# Patient Record
Sex: MALE | Race: WHITE | Employment: OTHER | URBAN - METROPOLITAN AREA
[De-identification: names, ages, dates, MRNs, and addresses within clinical notes are randomized per-mention and may not be internally consistent; named-entity substitution may affect disease eponyms.]

---

## 2017-03-06 PROBLEM — N99.114 POSTPROCEDURAL MALE URETHRAL STRICTURE: Status: ACTIVE | Noted: 2017-03-06

## 2017-03-06 PROBLEM — N52.9 ERECTILE DYSFUNCTION: Status: RESOLVED | Noted: 2017-03-06 | Resolved: 2017-03-06

## 2017-03-06 PROBLEM — N52.9 ERECTILE DYSFUNCTION: Status: ACTIVE | Noted: 2017-03-06

## 2023-10-16 ENCOUNTER — APPOINTMENT (OUTPATIENT)
Dept: CT IMAGING | Age: 68
DRG: 438 | End: 2023-10-16
Payer: MEDICARE

## 2023-10-16 ENCOUNTER — HOSPITAL ENCOUNTER (INPATIENT)
Age: 68
LOS: 2 days | Discharge: HOME | DRG: 438 | End: 2023-10-18
Attending: EMERGENCY MEDICINE | Admitting: INTERNAL MEDICINE
Payer: MEDICARE

## 2023-10-16 ENCOUNTER — APPOINTMENT (OUTPATIENT)
Dept: ULTRASOUND IMAGING | Age: 68
DRG: 438 | End: 2023-10-16
Payer: MEDICARE

## 2023-10-16 ENCOUNTER — APPOINTMENT (OUTPATIENT)
Dept: MRI IMAGING | Age: 68
DRG: 438 | End: 2023-10-16
Payer: MEDICARE

## 2023-10-16 ENCOUNTER — APPOINTMENT (OUTPATIENT)
Dept: GENERAL RADIOLOGY | Age: 68
DRG: 438 | End: 2023-10-16
Payer: MEDICARE

## 2023-10-16 DIAGNOSIS — K85.10 ACUTE BILIARY PANCREATITIS, UNSPECIFIED COMPLICATION STATUS: ICD-10-CM

## 2023-10-16 DIAGNOSIS — K80.50 CALCULUS OF BILE DUCT WITHOUT CHOLECYSTITIS AND WITHOUT OBSTRUCTION: Primary | ICD-10-CM

## 2023-10-16 DIAGNOSIS — N39.0 URINARY TRACT INFECTION WITHOUT HEMATURIA, SITE UNSPECIFIED: ICD-10-CM

## 2023-10-16 PROBLEM — E78.5 HYPERLIPIDEMIA: Status: ACTIVE | Noted: 2023-10-16

## 2023-10-16 PROBLEM — R10.11 RUQ ABDOMINAL PAIN: Status: ACTIVE | Noted: 2023-10-16

## 2023-10-16 PROBLEM — E11.9 DIABETES (HCC): Status: ACTIVE | Noted: 2023-10-16

## 2023-10-16 PROBLEM — K85.90 ACUTE PANCREATITIS WITHOUT INFECTION OR NECROSIS: Status: ACTIVE | Noted: 2023-10-16

## 2023-10-16 PROBLEM — E03.8 HYPOTHYROIDISM DUE TO HASHIMOTO'S THYROIDITIS: Status: ACTIVE | Noted: 2023-08-12

## 2023-10-16 PROBLEM — I10 HYPERTENSION: Status: ACTIVE | Noted: 2023-10-16

## 2023-10-16 PROBLEM — E06.3 HYPOTHYROIDISM DUE TO HASHIMOTO'S THYROIDITIS: Status: ACTIVE | Noted: 2023-08-12

## 2023-10-16 PROBLEM — R74.01 TRANSAMINITIS: Status: ACTIVE | Noted: 2023-10-16

## 2023-10-16 PROBLEM — N30.00 ACUTE CYSTITIS WITHOUT HEMATURIA: Status: ACTIVE | Noted: 2023-10-16

## 2023-10-16 PROBLEM — K80.20 CHOLELITHIASIS: Status: ACTIVE | Noted: 2023-10-16

## 2023-10-16 LAB
ALBUMIN SERPL-MCNC: 3.4 G/DL (ref 3.5–5.2)
ALP SERPL-CCNC: 221 U/L (ref 40–129)
ALT SERPL-CCNC: 72 U/L (ref 5–41)
AMYLASE SERPL-CCNC: 1135 U/L (ref 28–100)
ANION GAP SERPL CALCULATED.3IONS-SCNC: 11 MMOL/L (ref 9–17)
AST SERPL-CCNC: 120 U/L
BACTERIA URNS QL MICRO: ABNORMAL
BASOPHILS # BLD: 0 K/UL (ref 0–0.2)
BASOPHILS NFR BLD: 0 %
BILIRUB DIRECT SERPL-MCNC: 3.3 MG/DL
BILIRUB INDIRECT SERPL-MCNC: 1.5 MG/DL (ref 0–1)
BILIRUB SERPL-MCNC: 4.8 MG/DL (ref 0.3–1.2)
BILIRUB UR QL STRIP: ABNORMAL
BNP SERPL-MCNC: 171 PG/ML
BUN SERPL-MCNC: 13 MG/DL (ref 8–23)
BUN/CREAT SERPL: 13 (ref 9–20)
CALCIUM SERPL-MCNC: 9 MG/DL (ref 8.6–10.4)
CHLORIDE SERPL-SCNC: 102 MMOL/L (ref 98–107)
CLARITY UR: ABNORMAL
CO2 SERPL-SCNC: 27 MMOL/L (ref 20–31)
COLOR UR: ABNORMAL
CREAT SERPL-MCNC: 1 MG/DL (ref 0.7–1.2)
EKG ATRIAL RATE: 61 BPM
EKG P AXIS: 49 DEGREES
EKG P-R INTERVAL: 180 MS
EKG Q-T INTERVAL: 452 MS
EKG QRS DURATION: 112 MS
EKG QTC CALCULATION (BAZETT): 455 MS
EKG R AXIS: -14 DEGREES
EKG T AXIS: 24 DEGREES
EKG VENTRICULAR RATE: 61 BPM
EOSINOPHIL # BLD: 0 K/UL (ref 0–0.4)
EOSINOPHILS RELATIVE PERCENT: 0 % (ref 1–4)
EPI CELLS #/AREA URNS HPF: ABNORMAL /HPF (ref 0–5)
ERYTHROCYTE [DISTWIDTH] IN BLOOD BY AUTOMATED COUNT: 15.8 % (ref 11.8–14.4)
ETHANOL PERCENT: <0.01 %
ETHANOLAMINE SERPL-MCNC: <10 MG/DL
GFR SERPL CREATININE-BSD FRML MDRD: >60 ML/MIN/1.73M2
GLUCOSE BLD-MCNC: 138 MG/DL (ref 75–110)
GLUCOSE BLD-MCNC: 143 MG/DL (ref 75–110)
GLUCOSE SERPL-MCNC: 214 MG/DL (ref 70–99)
GLUCOSE UR STRIP-MCNC: ABNORMAL MG/DL
HCT VFR BLD AUTO: 36.8 % (ref 40.7–50.3)
HGB BLD-MCNC: 11.5 G/DL (ref 13–17)
HGB UR QL STRIP.AUTO: NEGATIVE
IMM GRANULOCYTES # BLD AUTO: 0.1 K/UL (ref 0–0.3)
IMM GRANULOCYTES NFR BLD: 1 %
KETONES UR STRIP-MCNC: ABNORMAL MG/DL
LACTATE BLDV-SCNC: 1.2 MMOL/L (ref 0.5–2.2)
LEUKOCYTE ESTERASE UR QL STRIP: ABNORMAL
LIPASE SERPL-CCNC: >3000 U/L (ref 13–60)
LYMPHOCYTES NFR BLD: 0.58 K/UL (ref 1–4.8)
LYMPHOCYTES RELATIVE PERCENT: 6 % (ref 24–44)
MCH RBC QN AUTO: 29.7 PG (ref 25.2–33.5)
MCHC RBC AUTO-ENTMCNC: 31.3 G/DL (ref 28.4–34.8)
MCV RBC AUTO: 95.1 FL (ref 82.6–102.9)
MONOCYTES NFR BLD: 0.58 K/UL (ref 0.2–0.8)
MONOCYTES NFR BLD: 6 % (ref 1–7)
NEUTROPHILS NFR BLD: 87 % (ref 36–66)
NEUTS SEG NFR BLD: 8.44 K/UL (ref 1.8–7.7)
NITRITE UR QL STRIP: POSITIVE
NRBC BLD-RTO: 0 PER 100 WBC
PH UR STRIP: 5.5 [PH] (ref 5–8)
PLATELET # BLD AUTO: 263 K/UL (ref 138–453)
PMV BLD AUTO: 9.6 FL (ref 8.1–13.5)
POTASSIUM SERPL-SCNC: 4.2 MMOL/L (ref 3.7–5.3)
PROT SERPL-MCNC: 7.3 G/DL (ref 6.4–8.3)
PROT UR STRIP-MCNC: ABNORMAL MG/DL
RBC # BLD AUTO: 3.87 M/UL (ref 4.21–5.77)
RBC #/AREA URNS HPF: ABNORMAL /HPF (ref 0–2)
SODIUM SERPL-SCNC: 140 MMOL/L (ref 135–144)
SP GR UR STRIP: 1.01 (ref 1–1.03)
TROPONIN I SERPL HS-MCNC: 9 NG/L (ref 0–22)
UROBILINOGEN UR STRIP-ACNC: ABNORMAL EU/DL (ref 0–1)
WBC #/AREA URNS HPF: ABNORMAL /HPF (ref 0–5)
WBC OTHER # BLD: 9.7 K/UL (ref 3.5–11.3)

## 2023-10-16 PROCEDURE — 81001 URINALYSIS AUTO W/SCOPE: CPT

## 2023-10-16 PROCEDURE — 74183 MRI ABD W/O CNTR FLWD CNTR: CPT

## 2023-10-16 PROCEDURE — 83605 ASSAY OF LACTIC ACID: CPT

## 2023-10-16 PROCEDURE — 96365 THER/PROPH/DIAG IV INF INIT: CPT

## 2023-10-16 PROCEDURE — 83690 ASSAY OF LIPASE: CPT

## 2023-10-16 PROCEDURE — 96366 THER/PROPH/DIAG IV INF ADDON: CPT

## 2023-10-16 PROCEDURE — 2580000003 HC RX 258: Performed by: NURSE PRACTITIONER

## 2023-10-16 PROCEDURE — 80048 BASIC METABOLIC PNL TOTAL CA: CPT

## 2023-10-16 PROCEDURE — 87088 URINE BACTERIA CULTURE: CPT

## 2023-10-16 PROCEDURE — 83880 ASSAY OF NATRIURETIC PEPTIDE: CPT

## 2023-10-16 PROCEDURE — 6360000002 HC RX W HCPCS: Performed by: NURSE PRACTITIONER

## 2023-10-16 PROCEDURE — 74177 CT ABD & PELVIS W/CONTRAST: CPT

## 2023-10-16 PROCEDURE — 84484 ASSAY OF TROPONIN QUANT: CPT

## 2023-10-16 PROCEDURE — 87186 SC STD MICRODIL/AGAR DIL: CPT

## 2023-10-16 PROCEDURE — 99285 EMERGENCY DEPT VISIT HI MDM: CPT

## 2023-10-16 PROCEDURE — 71045 X-RAY EXAM CHEST 1 VIEW: CPT

## 2023-10-16 PROCEDURE — 80076 HEPATIC FUNCTION PANEL: CPT

## 2023-10-16 PROCEDURE — 6370000000 HC RX 637 (ALT 250 FOR IP): Performed by: NURSE PRACTITIONER

## 2023-10-16 PROCEDURE — 6360000004 HC RX CONTRAST MEDICATION: Performed by: NURSE PRACTITIONER

## 2023-10-16 PROCEDURE — 99222 1ST HOSP IP/OBS MODERATE 55: CPT | Performed by: NURSE PRACTITIONER

## 2023-10-16 PROCEDURE — 82947 ASSAY GLUCOSE BLOOD QUANT: CPT

## 2023-10-16 PROCEDURE — 76705 ECHO EXAM OF ABDOMEN: CPT

## 2023-10-16 PROCEDURE — 85025 COMPLETE CBC W/AUTO DIFF WBC: CPT

## 2023-10-16 PROCEDURE — 82150 ASSAY OF AMYLASE: CPT

## 2023-10-16 PROCEDURE — 1200000000 HC SEMI PRIVATE

## 2023-10-16 PROCEDURE — G0480 DRUG TEST DEF 1-7 CLASSES: HCPCS

## 2023-10-16 PROCEDURE — 96375 TX/PRO/DX INJ NEW DRUG ADDON: CPT

## 2023-10-16 PROCEDURE — 87086 URINE CULTURE/COLONY COUNT: CPT

## 2023-10-16 PROCEDURE — 93005 ELECTROCARDIOGRAM TRACING: CPT | Performed by: NURSE PRACTITIONER

## 2023-10-16 PROCEDURE — A9579 GAD-BASE MR CONTRAST NOS,1ML: HCPCS | Performed by: NURSE PRACTITIONER

## 2023-10-16 RX ORDER — POTASSIUM CHLORIDE 20 MEQ/1
40 TABLET, EXTENDED RELEASE ORAL PRN
Status: DISCONTINUED | OUTPATIENT
Start: 2023-10-16 | End: 2023-10-18 | Stop reason: HOSPADM

## 2023-10-16 RX ORDER — DOXAZOSIN 2 MG/1
2 TABLET ORAL NIGHTLY
COMMUNITY

## 2023-10-16 RX ORDER — MORPHINE SULFATE 4 MG/ML
4 INJECTION, SOLUTION INTRAMUSCULAR; INTRAVENOUS
Status: DISCONTINUED | OUTPATIENT
Start: 2023-10-16 | End: 2023-10-18 | Stop reason: HOSPADM

## 2023-10-16 RX ORDER — SODIUM CHLORIDE, SODIUM LACTATE, POTASSIUM CHLORIDE, CALCIUM CHLORIDE 600; 310; 30; 20 MG/100ML; MG/100ML; MG/100ML; MG/100ML
INJECTION, SOLUTION INTRAVENOUS CONTINUOUS
Status: ACTIVE | OUTPATIENT
Start: 2023-10-16 | End: 2023-10-17

## 2023-10-16 RX ORDER — SODIUM CHLORIDE 9 MG/ML
INJECTION, SOLUTION INTRAVENOUS PRN
Status: DISCONTINUED | OUTPATIENT
Start: 2023-10-16 | End: 2023-10-18 | Stop reason: HOSPADM

## 2023-10-16 RX ORDER — MAGNESIUM SULFATE IN WATER 40 MG/ML
2000 INJECTION, SOLUTION INTRAVENOUS PRN
Status: DISCONTINUED | OUTPATIENT
Start: 2023-10-16 | End: 2023-10-18 | Stop reason: HOSPADM

## 2023-10-16 RX ORDER — LEVOTHYROXINE SODIUM 0.07 MG/1
75 TABLET ORAL DAILY
COMMUNITY

## 2023-10-16 RX ORDER — SODIUM CHLORIDE 0.9 % (FLUSH) 0.9 %
10 SYRINGE (ML) INJECTION ONCE
Status: DISCONTINUED | OUTPATIENT
Start: 2023-10-16 | End: 2023-10-18 | Stop reason: HOSPADM

## 2023-10-16 RX ORDER — SODIUM CHLORIDE 0.9 % (FLUSH) 0.9 %
10 SYRINGE (ML) INJECTION PRN
Status: DISCONTINUED | OUTPATIENT
Start: 2023-10-16 | End: 2023-10-16

## 2023-10-16 RX ORDER — INSULIN LISPRO 100 [IU]/ML
0-4 INJECTION, SOLUTION INTRAVENOUS; SUBCUTANEOUS
Status: DISCONTINUED | OUTPATIENT
Start: 2023-10-16 | End: 2023-10-18 | Stop reason: HOSPADM

## 2023-10-16 RX ORDER — DOXAZOSIN MESYLATE 1 MG/1
2 TABLET ORAL NIGHTLY
Status: DISCONTINUED | OUTPATIENT
Start: 2023-10-16 | End: 2023-10-18 | Stop reason: HOSPADM

## 2023-10-16 RX ORDER — PRAVASTATIN SODIUM 10 MG
10 TABLET ORAL DAILY
Status: CANCELLED | OUTPATIENT
Start: 2023-10-16

## 2023-10-16 RX ORDER — LOSARTAN POTASSIUM 50 MG/1
50 TABLET ORAL DAILY
COMMUNITY

## 2023-10-16 RX ORDER — ONDANSETRON 2 MG/ML
4 INJECTION INTRAMUSCULAR; INTRAVENOUS ONCE
Status: COMPLETED | OUTPATIENT
Start: 2023-10-16 | End: 2023-10-16

## 2023-10-16 RX ORDER — INSULIN LISPRO 100 [IU]/ML
0-4 INJECTION, SOLUTION INTRAVENOUS; SUBCUTANEOUS NIGHTLY
Status: DISCONTINUED | OUTPATIENT
Start: 2023-10-16 | End: 2023-10-18 | Stop reason: HOSPADM

## 2023-10-16 RX ORDER — LEVOTHYROXINE SODIUM 0.07 MG/1
75 TABLET ORAL DAILY
Status: DISCONTINUED | OUTPATIENT
Start: 2023-10-17 | End: 2023-10-18 | Stop reason: HOSPADM

## 2023-10-16 RX ORDER — 0.9 % SODIUM CHLORIDE 0.9 %
1000 INTRAVENOUS SOLUTION INTRAVENOUS ONCE
Status: COMPLETED | OUTPATIENT
Start: 2023-10-16 | End: 2023-10-16

## 2023-10-16 RX ORDER — GLIPIZIDE 5 MG/1
2.5 TABLET ORAL
Status: DISCONTINUED | OUTPATIENT
Start: 2023-10-17 | End: 2023-10-18 | Stop reason: HOSPADM

## 2023-10-16 RX ORDER — SODIUM CHLORIDE 0.9 % (FLUSH) 0.9 %
5-40 SYRINGE (ML) INJECTION EVERY 12 HOURS SCHEDULED
Status: DISCONTINUED | OUTPATIENT
Start: 2023-10-16 | End: 2023-10-18 | Stop reason: HOSPADM

## 2023-10-16 RX ORDER — LOSARTAN POTASSIUM 50 MG/1
50 TABLET ORAL DAILY
Status: DISCONTINUED | OUTPATIENT
Start: 2023-10-16 | End: 2023-10-18 | Stop reason: HOSPADM

## 2023-10-16 RX ORDER — ONDANSETRON 2 MG/ML
4 INJECTION INTRAMUSCULAR; INTRAVENOUS EVERY 6 HOURS PRN
Status: DISCONTINUED | OUTPATIENT
Start: 2023-10-16 | End: 2023-10-18 | Stop reason: HOSPADM

## 2023-10-16 RX ORDER — MORPHINE SULFATE 4 MG/ML
4 INJECTION, SOLUTION INTRAMUSCULAR; INTRAVENOUS ONCE
Status: COMPLETED | OUTPATIENT
Start: 2023-10-16 | End: 2023-10-16

## 2023-10-16 RX ORDER — FUROSEMIDE 20 MG/1
20 TABLET ORAL DAILY
Status: DISCONTINUED | OUTPATIENT
Start: 2023-10-16 | End: 2023-10-18 | Stop reason: HOSPADM

## 2023-10-16 RX ORDER — 0.9 % SODIUM CHLORIDE 0.9 %
100 INTRAVENOUS SOLUTION INTRAVENOUS ONCE
Status: COMPLETED | OUTPATIENT
Start: 2023-10-16 | End: 2023-10-16

## 2023-10-16 RX ORDER — DEXTROSE MONOHYDRATE 100 MG/ML
INJECTION, SOLUTION INTRAVENOUS CONTINUOUS PRN
Status: DISCONTINUED | OUTPATIENT
Start: 2023-10-16 | End: 2023-10-18 | Stop reason: HOSPADM

## 2023-10-16 RX ORDER — SODIUM CHLORIDE 0.9 % (FLUSH) 0.9 %
5-40 SYRINGE (ML) INJECTION PRN
Status: DISCONTINUED | OUTPATIENT
Start: 2023-10-16 | End: 2023-10-18 | Stop reason: HOSPADM

## 2023-10-16 RX ORDER — POTASSIUM CHLORIDE 7.45 MG/ML
10 INJECTION INTRAVENOUS PRN
Status: DISCONTINUED | OUTPATIENT
Start: 2023-10-16 | End: 2023-10-18 | Stop reason: HOSPADM

## 2023-10-16 RX ORDER — SODIUM CHLORIDE, SODIUM LACTATE, POTASSIUM CHLORIDE, CALCIUM CHLORIDE 600; 310; 30; 20 MG/100ML; MG/100ML; MG/100ML; MG/100ML
INJECTION, SOLUTION INTRAVENOUS CONTINUOUS
Status: DISCONTINUED | OUTPATIENT
Start: 2023-10-17 | End: 2023-10-18 | Stop reason: HOSPADM

## 2023-10-16 RX ORDER — MORPHINE SULFATE 2 MG/ML
2 INJECTION, SOLUTION INTRAMUSCULAR; INTRAVENOUS
Status: DISCONTINUED | OUTPATIENT
Start: 2023-10-16 | End: 2023-10-18 | Stop reason: HOSPADM

## 2023-10-16 RX ORDER — ONDANSETRON 4 MG/1
4 TABLET, ORALLY DISINTEGRATING ORAL EVERY 8 HOURS PRN
Status: DISCONTINUED | OUTPATIENT
Start: 2023-10-16 | End: 2023-10-18 | Stop reason: HOSPADM

## 2023-10-16 RX ORDER — SODIUM CHLORIDE 9 MG/ML
INJECTION, SOLUTION INTRAVENOUS CONTINUOUS
Status: DISCONTINUED | OUTPATIENT
Start: 2023-10-16 | End: 2023-10-16

## 2023-10-16 RX ORDER — POTASSIUM CHLORIDE 750 MG/1
10 CAPSULE, EXTENDED RELEASE ORAL DAILY
Status: DISCONTINUED | OUTPATIENT
Start: 2023-10-16 | End: 2023-10-18 | Stop reason: HOSPADM

## 2023-10-16 RX ORDER — ENOXAPARIN SODIUM 100 MG/ML
40 INJECTION SUBCUTANEOUS 2 TIMES DAILY
Status: DISCONTINUED | OUTPATIENT
Start: 2023-10-16 | End: 2023-10-18 | Stop reason: HOSPADM

## 2023-10-16 RX ADMIN — ONDANSETRON 4 MG: 2 INJECTION INTRAMUSCULAR; INTRAVENOUS at 15:32

## 2023-10-16 RX ADMIN — CEFTRIAXONE SODIUM 1000 MG: 1 INJECTION, POWDER, FOR SOLUTION INTRAMUSCULAR; INTRAVENOUS at 10:36

## 2023-10-16 RX ADMIN — GADOTERIDOL 20 ML: 279.3 INJECTION, SOLUTION INTRAVENOUS at 19:28

## 2023-10-16 RX ADMIN — SODIUM CHLORIDE: 9 INJECTION, SOLUTION INTRAVENOUS at 15:25

## 2023-10-16 RX ADMIN — ENOXAPARIN SODIUM 40 MG: 100 INJECTION SUBCUTANEOUS at 20:28

## 2023-10-16 RX ADMIN — SODIUM CHLORIDE 1000 ML: 9 INJECTION, SOLUTION INTRAVENOUS at 10:34

## 2023-10-16 RX ADMIN — LOSARTAN POTASSIUM 50 MG: 50 TABLET, FILM COATED ORAL at 20:28

## 2023-10-16 RX ADMIN — MORPHINE SULFATE 4 MG: 4 INJECTION, SOLUTION INTRAMUSCULAR; INTRAVENOUS at 11:42

## 2023-10-16 RX ADMIN — SODIUM CHLORIDE, POTASSIUM CHLORIDE, SODIUM LACTATE AND CALCIUM CHLORIDE: 600; 310; 30; 20 INJECTION, SOLUTION INTRAVENOUS at 23:07

## 2023-10-16 RX ADMIN — DOXAZOSIN 2 MG: 1 TABLET ORAL at 21:31

## 2023-10-16 RX ADMIN — SODIUM CHLORIDE, POTASSIUM CHLORIDE, SODIUM LACTATE AND CALCIUM CHLORIDE: 600; 310; 30; 20 INJECTION, SOLUTION INTRAVENOUS at 16:46

## 2023-10-16 RX ADMIN — POTASSIUM CHLORIDE 10 MEQ: 750 CAPSULE, EXTENDED RELEASE ORAL at 20:28

## 2023-10-16 RX ADMIN — SODIUM CHLORIDE 100 ML: 9 INJECTION, SOLUTION INTRAVENOUS at 12:23

## 2023-10-16 RX ADMIN — FUROSEMIDE 20 MG: 20 TABLET ORAL at 20:28

## 2023-10-16 RX ADMIN — SODIUM CHLORIDE, PRESERVATIVE FREE 10 ML: 5 INJECTION INTRAVENOUS at 12:22

## 2023-10-16 RX ADMIN — ONDANSETRON 4 MG: 2 INJECTION INTRAMUSCULAR; INTRAVENOUS at 11:42

## 2023-10-16 RX ADMIN — IOPAMIDOL 75 ML: 755 INJECTION, SOLUTION INTRAVENOUS at 12:10

## 2023-10-16 ASSESSMENT — PAIN - FUNCTIONAL ASSESSMENT: PAIN_FUNCTIONAL_ASSESSMENT: 0-10

## 2023-10-16 ASSESSMENT — PAIN SCALES - GENERAL: PAINLEVEL_OUTOF10: 3

## 2023-10-16 ASSESSMENT — ENCOUNTER SYMPTOMS
DIARRHEA: 0
SHORTNESS OF BREATH: 0
CONSTIPATION: 0
VOMITING: 0
NAUSEA: 0
COUGH: 0
CHEST TIGHTNESS: 0
ABDOMINAL PAIN: 1

## 2023-10-16 ASSESSMENT — PAIN DESCRIPTION - LOCATION: LOCATION: ABDOMEN;CHEST

## 2023-10-16 NOTE — ED PROVIDER NOTES
eMERGENCY dEPARTMENT eNCOUnter   Independent Attestation     Pt Name: Janel Sheriff  MRN: [id number]  [address] [facility] 1955  Date of evaluation: 10/16/23     Janel Sheriff is a 70 y.o. male with CC: Abdominal Pain and Chest Pain (STS STARTED SATURDAY AFTER EATING STS HAS GOTTEN BETTER BUT NOT GONE )      Based on the medical record the care appears appropriate. I was personally available for consultation in the Emergency Department.     Taj Olivas MD  Attending Emergency Physician                  Taj Olivas MD  10/16/23 9937 59 y/o M with PMHx HTN, DM type 2, GERD, s/p BKA, CAD s/p 3v CABG, and anemia admitted with:    Right lower extremity cellulitis, PVD     61 y/o M with PMHx HTN, DM type 2, GERD, s/p BKA, CAD s/p 3v CABG, and anemia admitted with:    Right lower extremity cellulitis, PVD s/p right lower extremity femoral to anterior tibial bypass with PTFE graft--POD 0    PLAN:  - Pain control PRN.  - q2 hr neurovascular checks.  - DAPT with ASA/plavix.  - Chemical DVT prophylaxis with heparin.  - Zosyn and doxycycline.  - Diabetes management.  - Continue home medications.  - Clear liquid diet. Advance as tolerated.

## 2023-10-16 NOTE — ED NOTES
Pt reports onset Saturday evening of gas pain/pressure after eating. Also c/o mild heartburn. Reports difficulty sleeping Saturday evening, but pain had resolved upon waking up Sunday morning. Reports pain returned Sunday afternoon and he again had a difficulty time sleeping last night. States pain is better today than Saturday evening, but thought he should come to get it checked out. Hx gastric bypass and hernia repair. Reports his urine was dark orange today. Denies nausea/vomiting.       Jaxson Hancock RN  10/16/23 7959

## 2023-10-16 NOTE — ED NOTES
Pt transported to CT scan via stretcher. Accompanied by transport agata.       Belgica Chatterjee RN  10/16/23 2704

## 2023-10-16 NOTE — H&P
[facility]  Office: [phone]  Toya Tanner, DO, David Polk, DO, Lora Culver, DO, Edward Wiggins MD, Maia Marin MD, Delta Valdez MD, Marisol Fernandez MD,  Ricky Chaudhry MD, Gino Bear MD, Samson Mirza DO, Selvin Coles MD,  Wing Dwaine MD, Elizabeth Vaughn MD, Imani Mejia, DO, Karen Griffin MD,  Jeffery Munroe DO, Yeny Hernandez MD, Hernesto Cruz MD, Andrey Rosas MD, Keenan Castrejon MD,  Sultana Starsk MD, Thom Kirkland MD, Petrona Sykes MD, Janie Miranda MD, Nimco Blackwood DO, Heath Lo MD,  Pawan Fontaine MD, Pearl Velez MD, Zacarias Luna, CNP,  Ginny Collins, CNP,, Brooklynn Shearer, CNP,  Meagan Han, [facility], Eloy Elizondo, CNP, Kuldip Lang, CNP, Rodo Ervin, CNP, Aaron Wellington, CNP, Cayden Young, CNP, Houston Lyon, CNP, Milla Vora, CNS, Jayme Capone, CNP, Violeta Alfonso, [address] [place] [address]    HISTORY AND PHYSICAL EXAMINATION            Date:   10/16/2023  Patient name:  Eder Skaggs  Date of admission:  10/16/2023  8:48 AM  MRN:   [id number]  Account:  [de-identified]  YOB: 1955  PCP:    Rad Galan MD  Room:   [place] [address]  Code Status:    Full    Chief Complaint:     Chief Complaint   Patient presents with    Abdominal Pain    Chest Pain     STS STARTED SATURDAY AFTER EATING STS HAS GOTTEN BETTER BUT NOT GONE      History Obtained From:     patient, electronic medical record    History of Present Illness:     Patient presents to the ED with complaints of right upper abdominal pain. Patient reports that the pain started Saturday evening/night and did not allow the patient to get much rest. Patient states that Sunday morning his pain was better but that it worsened throughout the day. Patient stated that he experienced dark colored urine and pain continued to return.  Pain is described as an aching pain and radiates through

## 2023-10-16 NOTE — ED PROVIDER NOTES
[facility]  eMERGENCY dEPARTMENT eNCOUnter      Pt Name: Yisel Gonzales  MRN: [id number]  [address] [facility] 1955  Date of evaluation: 10/16/2023  Provider: Neymar Carlton, [address] [place] [address]       Chief Complaint   Patient presents with    Abdominal Pain    Chest Pain     STS STARTED SATURDAY AFTER EATING STS HAS GOTTEN BETTER BUT NOT GONE          HISTORY OF PRESENT ILLNESS  (Location/Symptom, Timing/Onset, Context/Setting, Quality, Duration, Modifying Factors, Severity.)   Yisel Gonzales is a 70 y.o. male who presents to the emergency department for evaluation of right upper abdominal pain that started after he ate on Saturday. Patient states his symptoms subsided and then started having some pain again the other night. Pain is rated 10 upon arrival.  Pain radiates from his upper abdomen to his back at times. Pain is located in the right upper quadrant abdominal area and right lower chest area. No cough or shortness of breath. No nausea or vomiting. He is having regular bowel movements. History of gastric bypass surgery, diabetes, hernia repair, and obesity. Nursing Notes were reviewed. ALLERGIES     Patient has no known allergies.     CURRENT MEDICATIONS       Previous Medications    ASPIRIN 325 MG TABLET    1 tablet    DOXAZOSIN (CARDURA) 2 MG TABLET    Take 1 tablet by mouth nightly    FUROSEMIDE (LASIX) 20 MG TABLET    Take 20 mg by mouth daily    GLIPIZIDE (GLUCOTROL XL) 2.5 MG EXTENDED RELEASE TABLET    Take 1 tablet by mouth daily    LEVOTHYROXINE (SYNTHROID) 75 MCG TABLET    Take 1 tablet by mouth Daily    LOSARTAN (COZAAR) 50 MG TABLET    Take 1 tablet by mouth daily    METFORMIN (GLUCOPHAGE) 500 MG TABLET    Take 500 mg by mouth 2 times daily (with meals)    POTASSIUM CHLORIDE (K-DUR) 10 MEQ TABLET    Take 1 tablet by mouth daily    PRAVASTATIN (PRAVACHOL) 10 MG TABLET    Take 10 mg by mouth daily       PAST MEDICAL HISTORY         Diagnosis Date    Caffeine use Admission

## 2023-10-17 PROBLEM — K85.10 ACUTE BILIARY PANCREATITIS: Status: ACTIVE | Noted: 2023-10-16

## 2023-10-17 PROBLEM — N39.0 URINARY TRACT INFECTION WITHOUT HEMATURIA: Status: ACTIVE | Noted: 2023-10-17

## 2023-10-17 LAB
A1AT SERPL-MCNC: 212 MG/DL (ref 90–200)
ABO + RH BLD: NORMAL
AFP SERPL-MCNC: 2.2 UG/L
ALBUMIN SERPL-MCNC: 3 G/DL (ref 3.5–5.2)
ALP SERPL-CCNC: 200 U/L (ref 40–129)
ALT SERPL-CCNC: 58 U/L (ref 5–41)
ANION GAP SERPL CALCULATED.3IONS-SCNC: 8 MMOL/L (ref 9–17)
ARM BAND NUMBER: NORMAL
AST SERPL-CCNC: 91 U/L
BASOPHILS # BLD: <0.03 K/UL (ref 0–0.2)
BASOPHILS NFR BLD: 0 % (ref 0–2)
BILIRUB DIRECT SERPL-MCNC: 1.8 MG/DL
BILIRUB INDIRECT SERPL-MCNC: 1 MG/DL (ref 0–1)
BILIRUB SERPL-MCNC: 2.8 MG/DL (ref 0.3–1.2)
BLOOD BANK SAMPLE EXPIRATION: NORMAL
BLOOD GROUP ANTIBODIES SERPL: NEGATIVE
BUN SERPL-MCNC: 10 MG/DL (ref 8–23)
BUN/CREAT SERPL: 10 (ref 9–20)
CALCIUM SERPL-MCNC: 8.7 MG/DL (ref 8.6–10.4)
CERULOPLASMIN SERPL-MCNC: 47 MG/DL (ref 15–30)
CHLORIDE SERPL-SCNC: 104 MMOL/L (ref 98–107)
CO2 SERPL-SCNC: 25 MMOL/L (ref 20–31)
CREAT SERPL-MCNC: 1 MG/DL (ref 0.7–1.2)
EOSINOPHIL # BLD: 0.03 K/UL (ref 0–0.44)
EOSINOPHILS RELATIVE PERCENT: 0 % (ref 1–4)
ERYTHROCYTE [DISTWIDTH] IN BLOOD BY AUTOMATED COUNT: 15.6 % (ref 11.8–14.4)
GFR SERPL CREATININE-BSD FRML MDRD: >60 ML/MIN/1.73M2
GLUCOSE BLD-MCNC: 118 MG/DL (ref 75–110)
GLUCOSE BLD-MCNC: 133 MG/DL (ref 75–110)
GLUCOSE BLD-MCNC: 136 MG/DL (ref 75–110)
GLUCOSE SERPL-MCNC: 147 MG/DL (ref 70–99)
HAV IGM SERPL QL IA: NONREACTIVE
HBV CORE IGM SERPL QL IA: NONREACTIVE
HBV SURFACE AG SERPL QL IA: NONREACTIVE
HCT VFR BLD AUTO: 33.1 % (ref 40.7–50.3)
HCV AB SERPL QL IA: NONREACTIVE
HGB BLD-MCNC: 10.5 G/DL (ref 13–17)
IMM GRANULOCYTES # BLD AUTO: 0.03 K/UL (ref 0–0.3)
IMM GRANULOCYTES NFR BLD: 0 %
LIPASE SERPL-CCNC: 162 U/L (ref 13–60)
LYMPHOCYTES NFR BLD: 0.94 K/UL (ref 1.1–3.7)
LYMPHOCYTES RELATIVE PERCENT: 13 % (ref 24–43)
MCH RBC QN AUTO: 29.8 PG (ref 25.2–33.5)
MCHC RBC AUTO-ENTMCNC: 31.7 G/DL (ref 28.4–34.8)
MCV RBC AUTO: 94 FL (ref 82.6–102.9)
MONOCYTES NFR BLD: 0.51 K/UL (ref 0.1–1.2)
MONOCYTES NFR BLD: 7 % (ref 3–12)
NEUTROPHILS NFR BLD: 80 % (ref 36–65)
NEUTS SEG NFR BLD: 5.93 K/UL (ref 1.5–8.1)
NRBC BLD-RTO: 0 PER 100 WBC
PLATELET # BLD AUTO: 204 K/UL (ref 138–453)
PMV BLD AUTO: 9.9 FL (ref 8.1–13.5)
POTASSIUM SERPL-SCNC: 4 MMOL/L (ref 3.7–5.3)
PROT SERPL-MCNC: 6.3 G/DL (ref 6.4–8.3)
RBC # BLD AUTO: 3.52 M/UL (ref 4.21–5.77)
RBC # BLD: ABNORMAL 10*6/UL
SODIUM SERPL-SCNC: 137 MMOL/L (ref 135–144)
TRANSFERRIN SERPL-MCNC: 242 MG/DL (ref 200–360)
TRIGL SERPL-MCNC: 67 MG/DL
WBC OTHER # BLD: 7.5 K/UL (ref 3.5–11.3)

## 2023-10-17 PROCEDURE — 82105 ALPHA-FETOPROTEIN SERUM: CPT

## 2023-10-17 PROCEDURE — 80048 BASIC METABOLIC PNL TOTAL CA: CPT

## 2023-10-17 PROCEDURE — 6370000000 HC RX 637 (ALT 250 FOR IP): Performed by: NURSE PRACTITIONER

## 2023-10-17 PROCEDURE — 97116 GAIT TRAINING THERAPY: CPT

## 2023-10-17 PROCEDURE — 86901 BLOOD TYPING SEROLOGIC RH(D): CPT

## 2023-10-17 PROCEDURE — 86038 ANTINUCLEAR ANTIBODIES: CPT

## 2023-10-17 PROCEDURE — 82103 ALPHA-1-ANTITRYPSIN TOTAL: CPT

## 2023-10-17 PROCEDURE — 80076 HEPATIC FUNCTION PANEL: CPT

## 2023-10-17 PROCEDURE — 83690 ASSAY OF LIPASE: CPT

## 2023-10-17 PROCEDURE — 86376 MICROSOMAL ANTIBODY EACH: CPT

## 2023-10-17 PROCEDURE — 6360000002 HC RX W HCPCS: Performed by: NURSE PRACTITIONER

## 2023-10-17 PROCEDURE — 82947 ASSAY GLUCOSE BLOOD QUANT: CPT

## 2023-10-17 PROCEDURE — 36415 COLL VENOUS BLD VENIPUNCTURE: CPT

## 2023-10-17 PROCEDURE — 97535 SELF CARE MNGMENT TRAINING: CPT

## 2023-10-17 PROCEDURE — 86850 RBC ANTIBODY SCREEN: CPT

## 2023-10-17 PROCEDURE — 97166 OT EVAL MOD COMPLEX 45 MIN: CPT

## 2023-10-17 PROCEDURE — 84478 ASSAY OF TRIGLYCERIDES: CPT

## 2023-10-17 PROCEDURE — 2580000003 HC RX 258: Performed by: NURSE PRACTITIONER

## 2023-10-17 PROCEDURE — 97162 PT EVAL MOD COMPLEX 30 MIN: CPT

## 2023-10-17 PROCEDURE — 82390 ASSAY OF CERULOPLASMIN: CPT

## 2023-10-17 PROCEDURE — 86225 DNA ANTIBODY NATIVE: CPT

## 2023-10-17 PROCEDURE — 84466 ASSAY OF TRANSFERRIN: CPT

## 2023-10-17 PROCEDURE — 80074 ACUTE HEPATITIS PANEL: CPT

## 2023-10-17 PROCEDURE — 97530 THERAPEUTIC ACTIVITIES: CPT

## 2023-10-17 PROCEDURE — 83516 IMMUNOASSAY NONANTIBODY: CPT

## 2023-10-17 PROCEDURE — 86900 BLOOD TYPING SEROLOGIC ABO: CPT

## 2023-10-17 PROCEDURE — 1200000000 HC SEMI PRIVATE

## 2023-10-17 PROCEDURE — 99232 SBSQ HOSP IP/OBS MODERATE 35: CPT | Performed by: NURSE PRACTITIONER

## 2023-10-17 PROCEDURE — 85025 COMPLETE CBC W/AUTO DIFF WBC: CPT

## 2023-10-17 PROCEDURE — 97112 NEUROMUSCULAR REEDUCATION: CPT

## 2023-10-17 RX ADMIN — DOXAZOSIN 2 MG: 1 TABLET ORAL at 19:47

## 2023-10-17 RX ADMIN — FUROSEMIDE 20 MG: 20 TABLET ORAL at 09:07

## 2023-10-17 RX ADMIN — MORPHINE SULFATE 2 MG: 2 INJECTION, SOLUTION INTRAMUSCULAR; INTRAVENOUS at 03:20

## 2023-10-17 RX ADMIN — MORPHINE SULFATE 2 MG: 2 INJECTION, SOLUTION INTRAMUSCULAR; INTRAVENOUS at 14:26

## 2023-10-17 RX ADMIN — CEFTRIAXONE SODIUM 1000 MG: 1 INJECTION, POWDER, FOR SOLUTION INTRAMUSCULAR; INTRAVENOUS at 09:06

## 2023-10-17 RX ADMIN — POTASSIUM CHLORIDE 10 MEQ: 750 CAPSULE, EXTENDED RELEASE ORAL at 09:07

## 2023-10-17 RX ADMIN — LOSARTAN POTASSIUM 50 MG: 50 TABLET, FILM COATED ORAL at 09:06

## 2023-10-17 RX ADMIN — ENOXAPARIN SODIUM 40 MG: 100 INJECTION SUBCUTANEOUS at 19:46

## 2023-10-17 RX ADMIN — SODIUM CHLORIDE, POTASSIUM CHLORIDE, SODIUM LACTATE AND CALCIUM CHLORIDE: 600; 310; 30; 20 INJECTION, SOLUTION INTRAVENOUS at 03:18

## 2023-10-17 ASSESSMENT — PAIN DESCRIPTION - ORIENTATION: ORIENTATION: MID;UPPER

## 2023-10-17 ASSESSMENT — PAIN - FUNCTIONAL ASSESSMENT: PAIN_FUNCTIONAL_ASSESSMENT: ACTIVITIES ARE NOT PREVENTED

## 2023-10-17 ASSESSMENT — PAIN DESCRIPTION - PAIN TYPE: TYPE: ACUTE PAIN

## 2023-10-17 ASSESSMENT — ENCOUNTER SYMPTOMS
ABDOMINAL PAIN: 1
COUGH: 0
CHEST TIGHTNESS: 0
DIARRHEA: 0
CONSTIPATION: 0
VOMITING: 0
NAUSEA: 0
SHORTNESS OF BREATH: 0

## 2023-10-17 ASSESSMENT — PAIN SCALES - GENERAL
PAINLEVEL_OUTOF10: 4
PAINLEVEL_OUTOF10: 4

## 2023-10-17 ASSESSMENT — PAIN DESCRIPTION - LOCATION
LOCATION: BACK
LOCATION: ABDOMEN

## 2023-10-17 ASSESSMENT — PAIN DESCRIPTION - ONSET: ONSET: ON-GOING

## 2023-10-17 ASSESSMENT — PAIN DESCRIPTION - FREQUENCY: FREQUENCY: CONTINUOUS

## 2023-10-17 ASSESSMENT — PAIN DESCRIPTION - DESCRIPTORS: DESCRIPTORS: SORE;NAGGING

## 2023-10-17 NOTE — ACP (ADVANCE CARE PLANNING)
Advance Care Planning     Advance Care Planning Activator (Inpatient)  Conversation Note      Date of ACP Conversation: 10/17/2023     Conversation Conducted with: Patient with Decision Making Capacity    ACP Activator: Josr Tran RN    {When Decision Maker makes decisions on behalf of the incapacitated patient: Decision Maker is asked to consider and make decisions based on patient values, known preferences, or best interests. Health Care Decision Maker:     Current Designated Health Care Decision Maker:     Primary Decision Maker: [facility] - [phone]  Click here to complete Healthcare Decision Makers including section of the Healthcare Decision Maker Relationship (ie \"Primary\")      Care Preferences    Ventilation: \"If you were in your present state of health and suddenly became very ill and were unable to breathe on your own, what would your preference be about the use of a ventilator (breathing machine) if it were available to you? \"      Would the patient desire the use of ventilator (breathing machine)?: yes    \"If your health worsens and it becomes clear that your chance of recovery is unlikely, what would your preference be about the use of a ventilator (breathing machine) if it were available to you? \"     Would the patient desire the use of ventilator (breathing machine)?: No      Resuscitation  \"CPR works best to restart the heart when there is a sudden event, like a heart attack, in someone who is otherwise healthy. Unfortunately, CPR does not typically restart the heart for people who have serious health conditions or who are very sick. \"    \"In the event your heart stopped as a result of an underlying serious health condition, would you want attempts to be made to restart your heart (answer \"yes\" for attempt to resuscitate) or would you prefer a natural death (answer \"no\" for do not attempt to resuscitate)? \" yes       [] Yes   [x] No   Educated Patient / Shelah Link

## 2023-10-17 NOTE — CONSULTS
following findings  Cholelithiasis. Multiple small calculi in gallbladder. Mild to moderate  thickening of gallbladder wall. Probable mild edema surrounding gallbladder  wall. Ongoing cholecystitis not excluded. Maximal diameter of common bile duct is 5.3 mm, which is within normal  limits. There is no definable intraluminal filling defect or stone in common  bile duct; however, at the right wall of the lower end of common bile duct,  at, or just above the level of ampulla, there is focal wall thickening  causing 50% stenosis of the common bile duct at this point. This focal wall  thickening may be due to inflammatory change but focal soft tissue growth  cannot be excluded. Normal pancreas. Normal main pancreatic duct. No definable abnormality in liver, spleen, adrenal glands and kidneys on the  pre contrast and postcontrast images. Denies alcohol abuse  History of smoking  No illicit drug usage  His labs and charts were all reviewed with him  Symptoms:  Onset:  Location:  abdomen  Duration:  day(s)  Severity:  moderate, severe  Quality:  constant      Past Medical History:     Past Medical History:   Diagnosis Date    Caffeine use     1 coffee/day    Diabetes ([address])     Epididymitis     Erectile dysfunction     Hyperlipidemia     Hypertension     Personal history of prostate cancer     Urethral stricture         Past Surgical History:     Past Surgical History:   Procedure Laterality Date    BARIATRIC SURGERY      GASTRIC BYPASS SURGERY      HERNIA REPAIR      LYMPHADENECTOMY      PROSTATE BIOPSY      WRIST SURGERY          Medications Prior to Admission:       Prior to Admission medications    Medication Sig Start Date End Date Taking?  Authorizing Provider   doxazosin (CARDURA) 2 MG tablet Take 1 tablet by mouth nightly   Yes Lilliam Barber MD   levothyroxine (SYNTHROID) 75 MCG tablet Take 1 tablet by mouth Daily   Yes Lilliam Barber MD   losartan (COZAAR) 50 MG tablet Take 1
Jt Valenzuela IV,   on 10/17/2023 at 3:39 PM

## 2023-10-17 NOTE — CARE COORDINATION
Case Management Assessment  Initial Evaluation    Date/Time of Evaluation: 10/17/2023 9:18 AM  Assessment Completed by: Sanjuana Ingram RN    If patient is discharged prior to next notation, then this note serves as note for discharge by case management. Patient Name: Rebecca Link                   YOB: 1955  Diagnosis: Calculus of bile duct without cholecystitis and without obstruction [K80.50]  Urinary tract infection without hematuria, site unspecified [N39.0]  Acute pancreatitis without infection or necrosis [K85.90]  Acute biliary pancreatitis, unspecified complication status [R36.09]                   Date / Time: 10/16/2023  8:48 AM    Patient Admission Status: Inpatient   Readmission Risk (Low < 19, Mod (19-27), High > 27): Readmission Risk Score: 9.7    Current PCP: Kailey Prado MD  PCP verified by CM? Yes    Chart Reviewed: Yes      History Provided by: Patient  Patient Orientation: Alert and Oriented    Patient Cognition: Alert    Hospitalization in the last 30 days (Readmission):  No    If yes, Readmission Assessment in  Navigator will be completed. Advance Directives:      Code Status: Full Code   Patient's Primary Decision Maker is: Legal Next of Kin    Primary Decision MakerKael Allen Spouse - [phone]    Discharge Planning:    Patient lives with: Spouse/Significant Other Type of Home: House  Primary Care Giver: Self  Patient Support Systems include: Spouse/Significant Other, Children, Family Members, [employer], Friends/Neighbors   Current Financial resources: Medicare  Current community resources: None  Current services prior to admission: None            Current DME:  none            Type of Home Care services:  None    ADLS  Prior functional level: Independent in ADLs/IADLs  Current functional level: Independent in ADLs/IADLs    PT AM-PAC:   /24  OT AM-PAC:   /24    Family can provide assistance at DC:  Yes  Would you like Case Management to

## 2023-10-18 ENCOUNTER — ANESTHESIA (OUTPATIENT)
Dept: OPERATING ROOM | Age: 68
DRG: 438 | End: 2023-10-18
Payer: MEDICARE

## 2023-10-18 ENCOUNTER — ANESTHESIA EVENT (OUTPATIENT)
Dept: OPERATING ROOM | Age: 68
DRG: 438 | End: 2023-10-18
Payer: MEDICARE

## 2023-10-18 VITALS
HEART RATE: 64 BPM | BODY MASS INDEX: 50.62 KG/M2 | OXYGEN SATURATION: 98 % | WEIGHT: 315 LBS | HEIGHT: 66 IN | RESPIRATION RATE: 16 BRPM | TEMPERATURE: 98.4 F | DIASTOLIC BLOOD PRESSURE: 87 MMHG | SYSTOLIC BLOOD PRESSURE: 181 MMHG

## 2023-10-18 LAB
ALBUMIN SERPL-MCNC: 3 G/DL (ref 3.5–5.2)
ALP SERPL-CCNC: 187 U/L (ref 40–129)
ALT SERPL-CCNC: 46 U/L (ref 5–41)
ANION GAP SERPL CALCULATED.3IONS-SCNC: 9 MMOL/L (ref 9–17)
AST SERPL-CCNC: 58 U/L
BILIRUB DIRECT SERPL-MCNC: 0.6 MG/DL
BILIRUB INDIRECT SERPL-MCNC: 0.8 MG/DL (ref 0–1)
BILIRUB SERPL-MCNC: 1.4 MG/DL (ref 0.3–1.2)
BUN SERPL-MCNC: 11 MG/DL (ref 8–23)
BUN/CREAT SERPL: 12 (ref 9–20)
CALCIUM SERPL-MCNC: 8.7 MG/DL (ref 8.6–10.4)
CHLORIDE SERPL-SCNC: 101 MMOL/L (ref 98–107)
CO2 SERPL-SCNC: 26 MMOL/L (ref 20–31)
CREAT SERPL-MCNC: 0.9 MG/DL (ref 0.7–1.2)
ERYTHROCYTE [DISTWIDTH] IN BLOOD BY AUTOMATED COUNT: 15.3 % (ref 11.8–14.4)
GFR SERPL CREATININE-BSD FRML MDRD: >60 ML/MIN/1.73M2
GLUCOSE BLD-MCNC: 108 MG/DL (ref 75–110)
GLUCOSE BLD-MCNC: 89 MG/DL (ref 75–110)
GLUCOSE BLD-MCNC: 90 MG/DL (ref 75–110)
GLUCOSE SERPL-MCNC: 116 MG/DL (ref 70–99)
HCT VFR BLD AUTO: 32.2 % (ref 40.7–50.3)
HGB BLD-MCNC: 10.2 G/DL (ref 13–17)
LIPASE SERPL-CCNC: 54 U/L (ref 13–60)
MCH RBC QN AUTO: 29.7 PG (ref 25.2–33.5)
MCHC RBC AUTO-ENTMCNC: 31.7 G/DL (ref 28.4–34.8)
MCV RBC AUTO: 93.6 FL (ref 82.6–102.9)
MICROORGANISM SPEC CULT: ABNORMAL
NRBC BLD-RTO: 0 PER 100 WBC
PLATELET # BLD AUTO: 230 K/UL (ref 138–453)
PMV BLD AUTO: 10 FL (ref 8.1–13.5)
POTASSIUM SERPL-SCNC: 4.1 MMOL/L (ref 3.7–5.3)
PROT SERPL-MCNC: 6.5 G/DL (ref 6.4–8.3)
RBC # BLD AUTO: 3.44 M/UL (ref 4.21–5.77)
SODIUM SERPL-SCNC: 136 MMOL/L (ref 135–144)
SPECIMEN DESCRIPTION: ABNORMAL
WBC OTHER # BLD: 7.3 K/UL (ref 3.5–11.3)

## 2023-10-18 PROCEDURE — 7100000000 HC PACU RECOVERY - FIRST 15 MIN: Performed by: INTERNAL MEDICINE

## 2023-10-18 PROCEDURE — 99239 HOSP IP/OBS DSCHRG MGMT >30: CPT | Performed by: NURSE PRACTITIONER

## 2023-10-18 PROCEDURE — 3609017100 HC EGD: Performed by: INTERNAL MEDICINE

## 2023-10-18 PROCEDURE — 7100000001 HC PACU RECOVERY - ADDTL 15 MIN: Performed by: INTERNAL MEDICINE

## 2023-10-18 PROCEDURE — 80076 HEPATIC FUNCTION PANEL: CPT

## 2023-10-18 PROCEDURE — 0DJ08ZZ INSPECTION OF UPPER INTESTINAL TRACT, VIA NATURAL OR ARTIFICIAL OPENING ENDOSCOPIC: ICD-10-PCS | Performed by: FAMILY MEDICINE

## 2023-10-18 PROCEDURE — 6370000000 HC RX 637 (ALT 250 FOR IP): Performed by: NURSE PRACTITIONER

## 2023-10-18 PROCEDURE — 6360000002 HC RX W HCPCS: Performed by: NURSE ANESTHETIST, CERTIFIED REGISTERED

## 2023-10-18 PROCEDURE — 36415 COLL VENOUS BLD VENIPUNCTURE: CPT

## 2023-10-18 PROCEDURE — 3700000001 HC ADD 15 MINUTES (ANESTHESIA): Performed by: INTERNAL MEDICINE

## 2023-10-18 PROCEDURE — 3700000000 HC ANESTHESIA ATTENDED CARE: Performed by: INTERNAL MEDICINE

## 2023-10-18 PROCEDURE — 85027 COMPLETE CBC AUTOMATED: CPT

## 2023-10-18 PROCEDURE — 2709999900 HC NON-CHARGEABLE SUPPLY: Performed by: INTERNAL MEDICINE

## 2023-10-18 PROCEDURE — 80048 BASIC METABOLIC PNL TOTAL CA: CPT

## 2023-10-18 PROCEDURE — 2580000003 HC RX 258: Performed by: NURSE PRACTITIONER

## 2023-10-18 PROCEDURE — 2500000003 HC RX 250 WO HCPCS: Performed by: NURSE ANESTHETIST, CERTIFIED REGISTERED

## 2023-10-18 PROCEDURE — 83690 ASSAY OF LIPASE: CPT

## 2023-10-18 PROCEDURE — 82947 ASSAY GLUCOSE BLOOD QUANT: CPT

## 2023-10-18 RX ORDER — PROPOFOL 10 MG/ML
INJECTION, EMULSION INTRAVENOUS PRN
Status: DISCONTINUED | OUTPATIENT
Start: 2023-10-18 | End: 2023-10-18 | Stop reason: SDUPTHER

## 2023-10-18 RX ORDER — CIPROFLOXACIN 500 MG/1
500 TABLET, FILM COATED ORAL 2 TIMES DAILY
Qty: 14 TABLET | Refills: 0 | Status: SHIPPED | OUTPATIENT
Start: 2023-10-18 | End: 2023-10-25

## 2023-10-18 RX ORDER — LIDOCAINE HYDROCHLORIDE 20 MG/ML
INJECTION, SOLUTION EPIDURAL; INFILTRATION; INTRACAUDAL; PERINEURAL PRN
Status: DISCONTINUED | OUTPATIENT
Start: 2023-10-18 | End: 2023-10-18 | Stop reason: SDUPTHER

## 2023-10-18 RX ADMIN — GLIPIZIDE 2.5 MG: 5 TABLET ORAL at 06:48

## 2023-10-18 RX ADMIN — SODIUM CHLORIDE, POTASSIUM CHLORIDE, SODIUM LACTATE AND CALCIUM CHLORIDE: 600; 310; 30; 20 INJECTION, SOLUTION INTRAVENOUS at 02:21

## 2023-10-18 RX ADMIN — LEVOTHYROXINE SODIUM 75 MCG: 0.07 TABLET ORAL at 06:48

## 2023-10-18 RX ADMIN — LIDOCAINE HYDROCHLORIDE 50 MG: 20 INJECTION, SOLUTION EPIDURAL; INFILTRATION; INTRACAUDAL; PERINEURAL at 11:04

## 2023-10-18 RX ADMIN — PROPOFOL 125 MCG/KG/MIN: 10 INJECTION, EMULSION INTRAVENOUS at 11:04

## 2023-10-18 RX ADMIN — LOSARTAN POTASSIUM 50 MG: 50 TABLET, FILM COATED ORAL at 06:48

## 2023-10-18 NOTE — DISCHARGE SUMMARY
reconciliation, prescriptions for required medications, discharge plan and follow up. Electronically signed by   OLU Wilkes NP  10/18/2023  2:24 PM      Thank you Dr. Narciso English MD for the opportunity to be involved in this patient's care.

## 2023-10-18 NOTE — OP NOTE
PROCEDURE NOTE    DATE OF PROCEDURE: 10/18/2023     SURGEON: Ben Conway MD    ASSISTANT: None    PREOPERATIVE DIAGNOSIS: EPIG PAINS  ELEVATED LFTS  HX OF GASTRIC BYPASS    POSTOPERATIVE DIAGNOSIS: As described below    OPERATION: Upper GI endoscopy with Biopsy    ANESTHESIA: MAC PER ANESTHESIA     ESTIMATED BLOOD LOSS: Less than 50 ml    COMPLICATIONS: None. SPECIMENS:  Was Not Obtained    HISTORY: The patient is a 70y.o. year old male with history of above preop diagnosis. I recommended esophagogastroduodenoscopy with possible biopsy and I explained the risk, benefits, expected outcome, and alternatives to the procedure. Risks included but are not limited to bleeding, infection, respiratory distress, hypotension, and perforation of the esophagus, stomach, or duodenum. Patient understands and is in agreement. PROCEDURE: The patient was given IV conscious sedation. The patient's SPO2 remained above 90% throughout the procedure. The gastroscope was inserted orally and advanced under direct vision through the esophagus, through the stomach, through the pylorus, and into the descending duodenum. Findings:    Retropharyngeal area was grossly normal appearing    Esophagus: normal    Stomach:  EVIDENCE OF GASTRIC BYPASS WITH GERRY AND Y ANASTOMOSIS  NO ULCERATIONS LESIONS NOTED      The scope was removed and the patient tolerated the procedure well.      Recommendations/Plan:   F/U LFTS  REPEAT MRCP 3-4 WEEKS  F/U In Office in 3-4 weeks  Discussed with the family  Post sedation patient was stable with stable vital signs and stable O2 saturations    Electronically signed by Ben Conway MD  on 10/18/2023 at 11:10 AM

## 2023-10-18 NOTE — ANESTHESIA POSTPROCEDURE EVALUATION
Department of Anesthesiology  Postprocedure Note    Patient: Anastasia Doran  MRN: [id number]  YOB: 1955  Date of evaluation: 10/18/2023      Procedure Summary     Date: 10/18/23 Room / Location: [place] [address] / [facility] - INPATIENT    Anesthesia Start: 1420 Anesthesia Stop: 1120    Procedure: EGD ESOPHAGOGASTRODUODENOSCOPY (Mouth) Diagnosis:       Abnormal finding on imaging      (Abnormal finding on imaging [R93.89])    Surgeons: Geovanna Sanford MD Responsible Provider: Wade Booth DO    Anesthesia Type: MAC, general ASA Status: 3          Anesthesia Type: No value filed.     Gibson Phase I: Gibson Score: 10    Gibson Phase II:        Anesthesia Post Evaluation    Patient location during evaluation: PACU  Patient participation: complete - patient participated  Level of consciousness: awake and alert  Airway patency: patent  Nausea & Vomiting: no nausea and no vomiting  Complications: no  Cardiovascular status: hemodynamically stable  Respiratory status: acceptable  Hydration status: stable  Pain management: adequate

## 2023-10-18 NOTE — PLAN OF CARE
PAtient was set to have surgery but it was cancelled (See surgical resident's note). Patient on and tolerating clear liquid diet. Labs improved dramatically. Will be NPO at midnight for an EGD tomorrow. Remains on IVF and Rocephin daily.   Problem: Discharge Planning  Goal: Discharge to home or other facility with appropriate resources  Outcome: Progressing     Problem: Chronic Conditions and Co-morbidities  Goal: Patient's chronic conditions and co-morbidity symptoms are monitored and maintained or improved  Outcome: Progressing     Problem: Pain  Goal: Verbalizes/displays adequate comfort level or baseline comfort level  10/17/2023 1741 by Natty Red RN  Outcome: Progressing     Problem: ABCDS Injury Assessment  Goal: Absence of physical injury  Outcome: Progressing     Problem: Gastrointestinal - Adult  Goal: Maintains adequate nutritional intake  Outcome: Progressing     Problem: Metabolic/Fluid and Electrolytes - Adult  Goal: Electrolytes maintained within normal limits  Outcome: Progressing     Problem: Metabolic/Fluid and Electrolytes - Adult  Goal: Glucose maintained within prescribed range  Outcome: Progressing
Problem: Discharge Planning  Goal: Discharge to home or other facility with appropriate resources  Outcome: Progressing     Problem: Pain  Goal: Verbalizes/displays adequate comfort level or baseline comfort level  Outcome: Progressing
Problem: Pain  Goal: Verbalizes/displays adequate comfort level or baseline comfort level  10/17/2023 0347 by Josette Berger RN  Outcome: Progressing  Flowsheets (Taken 10/17/2023 9174)  Verbalizes/displays adequate comfort level or baseline comfort level:   Encourage patient to monitor pain and request assistance   Assess pain using appropriate pain scale   Administer analgesics based on type and severity of pain and evaluate response   Implement non-pharmacological measures as appropriate and evaluate response   Consider cultural and social influences on pain and pain management   Notify Licensed Independent Practitioner if interventions unsuccessful or patient reports new pain
Pt tolerating solid food.  Ready to go home  Problem: Discharge Planning  Goal: Discharge to home or other facility with appropriate resources  10/18/2023 1431 by Rafael Herbert RN  Outcome: Adequate for Discharge     Problem: Chronic Conditions and Co-morbidities  Goal: Patient's chronic conditions and co-morbidity symptoms are monitored and maintained or improved  10/18/2023 1431 by Rafael Herbert RN  Outcome: Adequate for Discharge     Problem: Pain  Goal: Verbalizes/displays adequate comfort level or baseline comfort level  10/18/2023 1431 by Rafael Herbert RN  Outcome: Adequate for Discharge     Problem: ABCDS Injury Assessment  Goal: Absence of physical injury  10/18/2023 1431 by Rafael Herbert RN  Outcome: Adequate for Discharge     Problem: Gastrointestinal - Adult  Goal: Maintains adequate nutritional intake  10/18/2023 1431 by Rafael Herbert RN  Outcome: Adequate for Discharge     Problem: Metabolic/Fluid and Electrolytes - Adult  Goal: Electrolytes maintained within normal limits  10/18/2023 1431 by Rafael Herbert RN  Outcome: Adequate for Discharge     Problem: Metabolic/Fluid and Electrolytes - Adult  Goal: Glucose maintained within prescribed range  10/18/2023 1431 by Rafael Herbert RN  Outcome: Adequate for Discharge     Problem: Safety - Adult  Goal: Free from fall injury  10/18/2023 1431 by Rafael Herbert RN  Outcome: Adequate for Discharge
improved  10/18/2023 3290 by Dk Porras RN  Outcome: Progressing  Flowsheets (Taken 10/17/2023 1900)  Care Plan - Patient's Chronic Conditions and Co-Morbidity Symptoms are Monitored and Maintained or Improved: Monitor and assess patient's chronic conditions and comorbid symptoms for stability, deterioration, or improvement  10/17/2023 1741 by Anamaria Aponte RN  Outcome: Progressing     Problem: Gastrointestinal - Adult  Goal: Maintains adequate nutritional intake  10/18/2023 0608 by Dk Porras RN  Outcome: Progressing  Flowsheets (Taken 10/17/2023 1900)  Maintains adequate nutritional intake:   Monitor percentage of each meal consumed   Identify factors contributing to decreased intake, treat as appropriate   Assist with meals as needed  10/17/2023 1741 by Anamaria Aponte RN  Outcome: Progressing     Problem: Metabolic/Fluid and Electrolytes - Adult  Goal: Electrolytes maintained within normal limits  10/18/2023 0608 by Dk Porras RN  Outcome: Progressing  Flowsheets (Taken 10/17/2023 1900)  Electrolytes maintained within normal limits:   Monitor labs and assess patient for signs and symptoms of electrolyte imbalances   Administer electrolyte replacement as ordered   Monitor response to electrolyte replacements, including repeat lab results as appropriate  10/17/2023 1741 by Anamaria Aponte RN  Outcome: Progressing

## 2023-10-18 NOTE — PROGRESS NOTES
CLINICAL PHARMACY NOTE: MEDS TO BEDS    Total # of Prescriptions Filled: 1   The following medications were delivered to the patient:  Ciprofloxacin 500mg    Additional Documentation:
[facility]  Office: [phone]  Thania Duke DO, Dana Galan DO, Oriana Culver DO, Javier Lew MD, Hola West MD, Jer Chaparro MD, Alexandr Hirsch MD,  Lisandra Garcia MD, Lamar Rosa MD, Yoly Ambrose DO, Fritz Kapoor MD,  Karli Moon MD, Trevon Greene MD, Gabbie Sorto DO, Willow Snellen, MD,  Toy Mendosa MD, Kelsey Venegas MD, Chyna Alvares MD, Justo Astudillo MD,  Paola Wharton MD, Perry Anderson MD, Maria Rodríguez MD, Rik Colby MD, Too Mendosa DO, Nany Palacios MD,  Davi Greenwood MD, Isac Contreras MD, Trinity Scherer, CNP,  Jarad Castro, CNP,, Jero Doherty, CNP,  Flor Jones, [facility], Bryant Blackmon, CNP, Karen Funes, CNP, Lucy Sauceda, CNP, Doristine Olszewski, CNP, Buffy Brunson, CNP, Gerber Roche, CNP, Carlos Dubon, CNS, Wade Hightower, CNP, Jerry Conley, [facility]    Progress Note    10/18/2023    10:47 AM    Name:   Sylwia Alcala  MRN:     [id number]     Acct:      [de-identified]   Room:   [facility] Day:  2  Admit Date:  10/16/2023  8:48 AM    PCP:   Sharee Tee MD  Code Status:  Full Code    Subjective:     C/C:   Chief Complaint   Patient presents with    Abdominal Pain    Chest Pain     STS STARTED SATURDAY AFTER EATING STS HAS GOTTEN BETTER BUT NOT GONE      Interval History Status: improved. Transaminitis is resolved, lipase is trended to normal.  Patient has no pain or symptoms of any kind today. Likely passed gallstone. EGD planned for 10/18, diet advancement, DC if able to tolerate diabetic diet. Will require outpatient follow-up with general surgery for scheduled cholecystectomy versus hepatobiliary consultation. Brief History:     Patient presents to the ED with complaints of right upper abdominal pain.  Patient reports that the pain started Saturday evening/night and did not allow
Occupational Therapy  DATE: 10/18/2023    NAME: Mima Chandler  MRN: [id number]   : 1955    Patient not seen this date for Occupational Therapy due to:      [] Cancel by RN or physician due to:    [] Hemodialysis    [] Critical Lab Value Level     [] Blood transfusion in progress    [] Acute or unstable cardiovascular status   _MAP < 55 or more than >115  _HR < 40 or > 130    [] Acute or unstable pulmonary status   -FiO2 > 60%   _RR < 5 or >40    _O2 sats < 85%    [] Strict Bedrest    [x] Off Unit for surgery or procedure: Off unit for EGD at this time. OT to continue to follow. [] Off Unit for testing       [] Pending imaging to R/O fracture    [] Refusal by Patient      [] Other      [] OT being discontinued at this time. Patient independent. No further needs. [] OT being discontinued at this time as the patient has been transferred to hospice care. No further needs.       Meet Spangler, OT
Physical Therapy  Facility/Department: [facility] SURG  Physical Therapy Initial Assessment    Name: Kathy Jeong  : 1955  MRN: [id number]  Date of Service: 10/17/2023    Discharge Recommendations:  Patient would benefit from continued therapy after discharge         Pt presented to ED on 10/16/23 with complaints of right upper abdominal pain. Patient reports that the pain started Saturday evening/night and did not allow the patient to get much rest. Patient states that  morning his pain was better but that it worsened throughout the day. Patient stated that he experienced dark colored urine and pain continued to return. Pain is described as an aching pain and radiates through to his back. Patient denies any chest pain, shortness of breath, nausea, vomiting or diarrhea. Patient also reports that he has not taken his home medications for the past 2 days due to not feeling well. CT abdomen and pelvis show: Cholelithiasis without CT findings to indicate acute cholecystitis. CXR showed: 1. Mild pulmonary vascular congestion. Cardiomegaly. Gallbladder US showed: 1. Cholelithiasis but no evidence of thickening of the gallbladder wall or pericholecystic fluid. Pt admitted for further medical management of acute pancreatitis. RN reports patient is medically stable for therapy treatment this date. Chart reviewed prior to treatment and patient is agreeable for therapy. Patient Diagnosis(es): The primary encounter diagnosis was Calculus of bile duct without cholecystitis and without obstruction. Diagnoses of Acute biliary pancreatitis, unspecified complication status and Urinary tract infection without hematuria, site unspecified were also pertinent to this visit. Past Medical History:  has a past medical history of Caffeine use, Diabetes ([address]), Epididymitis, Erectile dysfunction, Hyperlipidemia, Hypertension, Personal history of prostate cancer, and Urethral stricture.   Past
Pt admitted to room 2104. Oriented to room, call light and bed mechanics. Side rails up x2. Call light within reach. Orders reviewed. Gen surg is at the bedside.
Pt discharged to home in good condition with belongings  Discharge instructions given  \"Meds To Beds\" medication at bedside  Pt denies having any further questions at this time  Locked up home medication(s)/personal items given to patient at discharge  Patient/family state they have everything they were admitted with.
Reviewed MRCP with colleagues. 50% stenosis of the common bile duct could be inflammatory stricture vs possibly mass. Patients labs are improving and he is feeling better. Given patient has altered anatomy due to his Ranjeet-en-Y gastric bypass, this limits our options of ERCP or EUS preop or postop. Due to these limitations I think it would be best that if patient continues to improve we should do a PO challenge. If he is able to tolerate a diet and pain resolves then repeat an MRCP in 3 weeks to see if the stricture has improved or not. If the the stricture is gone the stricture was likely secondary to inflammation and we would then feel more safe proceeding with cholecystectomy. If the stricture remains patient will likely need further work-up with EUS or ERCP prior to cholecystectomy.     Javi Cordero, DO PGY 5  General Surgery Resident  10/17/23 11:49 AM
The pt was taken off the unit in stable condition for testing
[facility]  Office: [phone]  Ruby Israel DO, Trixie Albarado Blood, DO, Ginger Sykes MD, Severo Lopes, MD, Shashank Peryr MD, Lauren Dozier MD,  Demarco Abdul MD, Sidney Loya MD, Natalee Quevedo DO, Justine Campuzano MD,  Gasper Ma MD, Chente Conklin MD, Carol Chadwick DO, Huy Perez MD,  Devika Bertrand DO, Bharat Tamayo MD, Maurice Vila MD, Jeovanny Newman MD, Tristan Eason MD,  Demetrio Suresh MD, Khushi Hancock MD, Troy Mariee MD, Danny Olivas MD, Jarod Rome DO, Christy Boss MD,  Fox Recinos MD, Celia Mortimer, MD, Madhu Cheng, CNP,  Ced Clemente, CNP,, Kai Hinton, CNP,  Ish Gabriel, [facility], Daniel Correa, [facility], Elian Johnson, CNP, J Luis Cordova, CNP, Bob Cleaning, CNP, Breezy Cornejo, CNP, Hollie Arnold, CNP, Patrick Mae, CNS, Markie Carranza, [facility], Saúl Bear, [facility]    Progress Note    10/17/2023    10:25 AM    Name:   Megan King  MRN:     [id number]     Acct:      [de-identified]   Room:   [facility] [place] [address] Day:  1  Admit Date:  10/16/2023  8:48 AM    PCP:   Forrest Carreno MD  Code Status:  Full Code    Subjective:     C/C:   Chief Complaint   Patient presents with    Abdominal Pain    Chest Pain     STS STARTED SATURDAY AFTER EATING STS HAS GOTTEN BETTER BUT NOT GONE      Interval History Status: improved. Patient sitting up working with therapy. He states that his pain has much improved. He denies any chest pain or shortness of breath. He reports having a small amount of blood come out from the tip of his penis during urination. Never had this happen before. Will continue to monitor. Brief History:     Patient presents to the ED with complaints of right upper abdominal pain.  Patient reports that the pain started Saturday evening/night and did not allow the patient to get much rest. Patient states that
any chest pain, shortness of breath, nausea, vomiting or diarrhea. Patient also reports that he has not taken his home medications for the past 2 days due to not feeling well      Assessment   Performance deficits / Impairments: Decreased functional mobility ; Decreased safe awareness;Decreased balance;Decreased ADL status; Decreased endurance;Decreased high-level IADLs  Assessment: Skilled OT POC is indicated to increase I/safety with ADL and functional tasks as well as improve balance/activity tolerance to reduce fall risk and return home with assist as needed. Prognosis: Good;Fair  Decision Making: Medium Complexity  REQUIRES OT FOLLOW-UP: Yes  Activity Tolerance  Activity Tolerance: Patient limited by fatigue  Activity Tolerance Comments: stand geraldine 3-5 plus mins; fair overall        Plan   Occupational Therapy Plan  Times Per Week: 4-5x/week 1x/day as geraldine  Current Treatment Recommendations: Strengthening, Balance training, Functional mobility training, Endurance training, Pain management, Neuromuscular re-education, Safety education & training, Patient/Caregiver education & training, Equipment evaluation, education, & procurement, Positioning, Self-Care / ADL, Home management training     Restrictions  Restrictions/Precautions  Restrictions/Precautions: General Precautions, Fall Risk  Position Activity Restriction  Other position/activity restrictions:  Up with assist, telemetry, LUE IV, clear liquids, alarms    Subjective   General  Chart Reviewed: Yes  Patient assessed for rehabilitation services?: Yes  Family / Caregiver Present: No  Subjective  Subjective: Pt denies pain     Social/Functional History  Social/Functional History  Lives With: Spouse (retired and able to assist some as needed; has had double masectomy and other medical issues)  Type of Home: House  Home Layout: Two level, Bed/Bath upstairs, 1/2 bath on main level, Laundry in basement, Able to Live on Main level with bedroom/bathroom (16 steps and
PORTABLE   Final Result   1. Mild pulmonary vascular congestion. Cardiomegaly. 2. No lobar airspace consolidation or pleural effusions. ASSESSMENT:  Active Hospital Problems    Diagnosis Date Noted    Urinary tract infection without hematuria [N39.0] 10/17/2023    Acute biliary pancreatitis [K85.10] 10/16/2023    Transaminitis [R74.01] 10/16/2023    Cholelithiasis [K80.20] 10/16/2023    Acute cystitis without hematuria [N30.00] 10/16/2023    RUQ abdominal pain [R10.11] 10/16/2023    Diabetes ([address]) [E11.9] 10/16/2023    Hypertension [I10] 10/16/2023    Hyperlipidemia [E78.5] 10/16/2023    Hypothyroidism due to Hashimoto's thyroiditis [E03.8, E06.3] 08/12/2023       70year old male with gallstone pancreatitis  History of Ranjeet-en-Y  Hyperbilirubinemia, transaminitis concerning for possible choledocholithiasis- now resolved  MRCP with wall thickening of CBD sludge vs inflammation vs stricture    Plan:  Continue medical mgmt and supportive care per primary  Lipase downtrending  LFTs downtrending, bilirubin downtrending  MRI done 10/17 revealing:  Cholelithiasis with mild to moderate thickening of GBB wall  CBD diameter 5.3 mm without filling defect or stone  Focal wall thickening at right wall of the lower end of CBD or at level of ampulla causing 50% stenosis of the common bile duct at this point. Normal pancreas/main pancreatic duct  Egd per GI today  Repeat MRCP in 3 weeks- cholecystectomy vs HPB referral pending results  Low fat diet when able  Will see in office with mri results        Electronically signed by Jah Mcclellan DO on 10/18/2023 at 8:32 AM    Attending Physician Statement  I have discussed the case with Dr Eder Hardin, including pertinent history and exam findings with the resident. I have seen and examined the patient and the key elements of the encounter have been performed by me. I agree with the assessment, plan and orders as documented by the resident.       Electronically signed by
US GALLBLADDER RUQ   Preliminary Result   1. Cholelithiasis but no evidence of thickening of the gallbladder wall or   pericholecystic fluid. 2. Mildly prominent common bile duct measuring 0.71 cm. Please correlate   with liver function tests for possible biliary obstruction. 3. Normal liver with patent portal vein flowing in the hepatopetal correct   direction. XR CHEST PORTABLE   Final Result   1. Mild pulmonary vascular congestion. Cardiomegaly. 2. No lobar airspace consolidation or pleural effusions. ASSESSMENT:  Active Hospital Problems    Diagnosis Date Noted    Acute pancreatitis without infection or necrosis [K85.90] 10/16/2023    Transaminitis [R74.01] 10/16/2023    Cholelithiasis [K80.20] 10/16/2023    Acute cystitis without hematuria [N30.00] 10/16/2023    RUQ abdominal pain [R10.11] 10/16/2023    Diabetes ([address]) [E11.9] 10/16/2023    Hypertension [I10] 10/16/2023    Hyperlipidemia [E78.5] 10/16/2023    Hypothyroidism due to Hashimoto's thyroiditis [E03.8, E06.3] 08/12/2023       70year old male with gallstone pancreatitis  History of Ranjeet-en-Y  Hyperbilirubinemia, transaminitis concerning for possible choledocholithiasis    Plan:  Continue medical mgmt and supportive care per primary  Lipase downtrending  -162 (>3000)  LFTs downtrending  ALT/AST 58/91 (72/120)  Tbili 2.8 (4.8)  Dbili 1.8 (3.3)  MRI done 10/17 revealing:  Cholelithiasis with mild to moderate thickening of GBB wall  CBD diameter 5.3 mm without filling defect or stone  Focal wall thickening at right wall of the lower end of CBD or at level of ampulla causing 50% stenosis of the common bile duct at this point. Normal pancreas/main pancreatic duct  Diet NPO. hold ASA  Plan for robotic laparoscopic cholecystectomy today, will need consent. above.     Electronically signed by Gisselle Mendiola DO on 10/17/2023 at 8:57 AM    Attending Physician Statement  I have discussed the case with Dr Brock Wagner,

## 2023-10-19 LAB
LKM AB TITR SER IF: NORMAL {TITER}
SMOOTH MUSCLE ANTIBODY: 14 UNITS (ref 0–19)

## 2023-10-23 LAB
ANA SER QL IA: NEGATIVE
DSDNA IGG SER QL IA: 0.8 IU/ML
MITOCHONDRIA M2 IGG SER-ACNC: 1.1 U/ML (ref 0–4)
NUCLEAR IGG SER IA-RTO: 0.4 U/ML

## 2023-10-30 ENCOUNTER — HOSPITAL ENCOUNTER (OUTPATIENT)
Age: 68
Setting detail: SPECIMEN
Discharge: HOME | End: 2023-10-30
Payer: MEDICARE

## 2023-10-30 LAB — PSA SERPL-MCNC: 0.03 NG/ML

## 2023-10-30 PROCEDURE — 36415 COLL VENOUS BLD VENIPUNCTURE: CPT

## 2023-10-30 PROCEDURE — 84153 ASSAY OF PSA TOTAL: CPT

## 2023-11-01 ENCOUNTER — OFFICE VISIT (OUTPATIENT)
Age: 68
End: 2023-11-01
Payer: MEDICARE

## 2023-11-01 ENCOUNTER — HOSPITAL ENCOUNTER (OUTPATIENT)
Age: 68
Setting detail: SPECIMEN
Discharge: HOME | End: 2023-11-01

## 2023-11-01 ENCOUNTER — OFFICE VISIT (OUTPATIENT)
Dept: SURGERY | Age: 68
End: 2023-11-01
Payer: MEDICARE

## 2023-11-01 VITALS — HEIGHT: 65 IN | BODY MASS INDEX: 52.48 KG/M2 | WEIGHT: 315 LBS

## 2023-11-01 VITALS
HEART RATE: 77 BPM | BODY MASS INDEX: 50.62 KG/M2 | WEIGHT: 315 LBS | DIASTOLIC BLOOD PRESSURE: 85 MMHG | HEIGHT: 66 IN | SYSTOLIC BLOOD PRESSURE: 156 MMHG

## 2023-11-01 DIAGNOSIS — D17.79 MYELOLIPOMA OF RIGHT ADRENAL GLAND: ICD-10-CM

## 2023-11-01 DIAGNOSIS — N39.0 E. COLI UTI: Primary | ICD-10-CM

## 2023-11-01 DIAGNOSIS — K83.1 COMMON BILE DUCT STENOSIS: Primary | ICD-10-CM

## 2023-11-01 DIAGNOSIS — N52.8 OTHER MALE ERECTILE DYSFUNCTION: ICD-10-CM

## 2023-11-01 DIAGNOSIS — K83.1 COMMON BILE DUCT STENOSIS: ICD-10-CM

## 2023-11-01 DIAGNOSIS — Z85.46 PERSONAL HISTORY OF PROSTATE CANCER: ICD-10-CM

## 2023-11-01 DIAGNOSIS — B96.20 E. COLI UTI: Primary | ICD-10-CM

## 2023-11-01 LAB
ALBUMIN SERPL-MCNC: 3.6 G/DL (ref 3.5–5.2)
ALBUMIN/GLOB SERPL: 0.9 {RATIO} (ref 1–2.5)
ALP SERPL-CCNC: 135 U/L (ref 40–129)
ALT SERPL-CCNC: 13 U/L (ref 5–41)
AST SERPL-CCNC: 33 U/L
BILIRUB DIRECT SERPL-MCNC: 0.2 MG/DL
BILIRUB INDIRECT SERPL-MCNC: 0.5 MG/DL (ref 0–1)
BILIRUB SERPL-MCNC: 0.7 MG/DL (ref 0.3–1.2)
BILIRUBIN, POC: NORMAL
BLOOD URINE, POC: NORMAL
CLARITY, POC: CLEAR
COLOR, POC: YELLOW
GLUCOSE URINE, POC: NORMAL
KETONES, POC: NORMAL
LEUKOCYTE EST, POC: NORMAL
NITRITE, POC: NORMAL
PH, POC: NORMAL
PROT SERPL-MCNC: 7.5 G/DL (ref 6.4–8.3)
PROTEIN, POC: NORMAL
SPECIFIC GRAVITY, POC: NORMAL
UROBILINOGEN, POC: NORMAL

## 2023-11-01 PROCEDURE — 3017F COLORECTAL CA SCREEN DOC REV: CPT | Performed by: STUDENT IN AN ORGANIZED HEALTH CARE EDUCATION/TRAINING PROGRAM

## 2023-11-01 PROCEDURE — 1111F DSCHRG MED/CURRENT MED MERGE: CPT | Performed by: SPECIALIST

## 2023-11-01 PROCEDURE — 1123F ACP DISCUSS/DSCN MKR DOCD: CPT | Performed by: SPECIALIST

## 2023-11-01 PROCEDURE — 1123F ACP DISCUSS/DSCN MKR DOCD: CPT | Performed by: STUDENT IN AN ORGANIZED HEALTH CARE EDUCATION/TRAINING PROGRAM

## 2023-11-01 PROCEDURE — G8427 DOCREV CUR MEDS BY ELIG CLIN: HCPCS | Performed by: STUDENT IN AN ORGANIZED HEALTH CARE EDUCATION/TRAINING PROGRAM

## 2023-11-01 PROCEDURE — 3074F SYST BP LT 130 MM HG: CPT | Performed by: STUDENT IN AN ORGANIZED HEALTH CARE EDUCATION/TRAINING PROGRAM

## 2023-11-01 PROCEDURE — G8427 DOCREV CUR MEDS BY ELIG CLIN: HCPCS | Performed by: SPECIALIST

## 2023-11-01 PROCEDURE — 1036F TOBACCO NON-USER: CPT | Performed by: SPECIALIST

## 2023-11-01 PROCEDURE — G8484 FLU IMMUNIZE NO ADMIN: HCPCS | Performed by: STUDENT IN AN ORGANIZED HEALTH CARE EDUCATION/TRAINING PROGRAM

## 2023-11-01 PROCEDURE — 99203 OFFICE O/P NEW LOW 30 MIN: CPT | Performed by: STUDENT IN AN ORGANIZED HEALTH CARE EDUCATION/TRAINING PROGRAM

## 2023-11-01 PROCEDURE — 81003 URINALYSIS AUTO W/O SCOPE: CPT | Performed by: SPECIALIST

## 2023-11-01 PROCEDURE — 3017F COLORECTAL CA SCREEN DOC REV: CPT | Performed by: SPECIALIST

## 2023-11-01 PROCEDURE — G8484 FLU IMMUNIZE NO ADMIN: HCPCS | Performed by: SPECIALIST

## 2023-11-01 PROCEDURE — 3078F DIAST BP <80 MM HG: CPT | Performed by: STUDENT IN AN ORGANIZED HEALTH CARE EDUCATION/TRAINING PROGRAM

## 2023-11-01 PROCEDURE — G8417 CALC BMI ABV UP PARAM F/U: HCPCS | Performed by: SPECIALIST

## 2023-11-01 PROCEDURE — 1036F TOBACCO NON-USER: CPT | Performed by: STUDENT IN AN ORGANIZED HEALTH CARE EDUCATION/TRAINING PROGRAM

## 2023-11-01 PROCEDURE — G8417 CALC BMI ABV UP PARAM F/U: HCPCS | Performed by: STUDENT IN AN ORGANIZED HEALTH CARE EDUCATION/TRAINING PROGRAM

## 2023-11-01 PROCEDURE — 99214 OFFICE O/P EST MOD 30 MIN: CPT | Performed by: SPECIALIST

## 2023-11-01 PROCEDURE — 1111F DSCHRG MED/CURRENT MED MERGE: CPT | Performed by: STUDENT IN AN ORGANIZED HEALTH CARE EDUCATION/TRAINING PROGRAM

## 2023-11-01 NOTE — PROGRESS NOTES
Visit Information    Have you changed or started any medications since your last visit including any over-the-counter medicines, vitamins, or herbal medicines? no   Are you having any side effects from any of your medications? -  no  Have you stopped taking any of your medications? Is so, why? -  no    Have you seen any other physician or provider since your last visit? Yes - Records Requested  Have you had any other diagnostic tests since your last visit? Yes - Records Requested  Have you been seen in the emergency room and/or had an admission to a hospital since we last saw you? Yes - Records Requested  Have you had your routine dental cleaning in the past 6 months? no    Have you activated your [email or web address] account? If not, what are your barriers?  No:      Patient Care Team:  Jenise Lux MD as PCP - General (Family Medicine)  Josy Alexander MD as Consulting Physician (Urology)    Medical History Review  Past Medical, Family, and Social History reviewed and does not contribute to the patient presenting condition    Health Maintenance   Topic Date Due    Pneumococcal 65+ years Vaccine (1 - PCV) Never done    Diabetic foot exam  Never done    A1C test (Diabetic or Prediabetic)  Never done    Lipids  Never done    Depression Screen  Never done    Diabetic Alb to Cr ratio (uACR) test  Never done    Diabetic retinal exam  Never done    Colorectal Cancer Screen  Never done    Shingles vaccine (1 of 2) Never done    Hepatitis B vaccine (1 of 3 - Risk 3-dose series) Never done    Annual Wellness Visit (AWV)  Never done    COVID-19 Vaccine (3 - Pfizer series) 05/16/2021    Flu vaccine (1) Never done    GFR test (Diabetes, CKD 3-4, OR last GFR 15-59)  10/18/2024    DTaP/Tdap/Td vaccine (2 - Td or Tdap) 03/09/2028    Hepatitis C screen  Completed    Hepatitis A vaccine  Aged Out    Hib vaccine  Aged Out    Meningococcal (ACWY) vaccine  Aged Out    Prostate Specific Antigen (PSA) Screening or Monitoring  Discontinued

## 2023-11-01 NOTE — PATIENT INSTRUCTIONS
Thank you letting us take care of you today. We hope that all your questions were addressed. If a question was overlooked or something else comes to mind after you return home, please call our office at [phone]. If you need to cancel or change an appointment, surgery or procedure, please contact the office at [phone].

## 2023-11-01 NOTE — PROGRESS NOTES
[facility], [address] [place] [address] Urology Office Progress Note    Patient:  Janene Sanchez  YOB: 1955  Date: 11/1/2023    HISTORY OF PRESENT ILLNESS:   The patient is a 70 y.o. male  Patient recently treated for an E coli UTI with Cipro. Currently he is asymptomatic and today's UA was negative. No evidence of prostate cancer recurrence s/p XRT ending 2004 since his PSA is 0.03 which is stable/low. Not interested in medical Rx for ED since patient not sexually active. Patient has an incidental right 1.9 cm adrenal myelolipoma on 10/16/23 CT abdomen and pelvis with contrast of no clinical significance; No further urologic workup or intervention required.    Lower urinary tract symptoms: nocturia, 2 times per night   Last AUA Symptom Score (QOL): 1 (0)  Today's AUA Symptom Score (QOL): 2 (1)    Summary of old records:   [address] fan  History of urethral stricture s/p cysto, dilation 8/28/07; 3/6/17 cysto: 2 large caliber bulbous strictures  History of prostate cancer: T1c T2b G9, XRT and Lupron 2004  ED on Cialis 20 mg prn, no longer needs since he lost weight  10/16/23 E coli UTI  Right 1.9 cm adrenal myelolipoma on 10/16/23 CT    Additional History: none    Procedures Today: N/A    Urinalysis today:  Results for POC orders placed in visit on 11/01/23   POCT Urinalysis No Micro (Auto)   Result Value Ref Range    Color, UA yellow     Clarity, UA clear     Glucose, UA POC neg     Bilirubin, UA      Ketones, UA      Spec Grav, UA      Blood, UA POC neg     pH, UA      Protein, UA POC neg     Urobilinogen, UA      Leukocytes, UA neg     Nitrite, UA neg        Last several PSA's:  Lab Results   Component Value Date    PSA 0.03 10/30/2023    PSA 0.02 10/20/2022    PSA 0.03 10/23/2021       Last total testosterone:  No results found for: \"TESTOSTERONE\"    Last BUN and creatinine:  Lab Results   Component Value Date    BUN 11 10/18/2023     Lab Results   Component Value Date

## 2023-11-02 ENCOUNTER — HOSPITAL ENCOUNTER (OUTPATIENT)
Dept: INTERVENTIONAL RADIOLOGY/VASCULAR | Age: 68
Discharge: HOME | End: 2023-11-04
Payer: MEDICARE

## 2023-11-02 ENCOUNTER — HOSPITAL ENCOUNTER (OUTPATIENT)
Dept: MRI IMAGING | Age: 68
Discharge: HOME | End: 2023-11-04
Payer: MEDICARE

## 2023-11-02 DIAGNOSIS — K83.1 COMMON BILE DUCT STENOSIS: ICD-10-CM

## 2023-11-02 PROCEDURE — A9579 GAD-BASE MR CONTRAST NOS,1ML: HCPCS | Performed by: STUDENT IN AN ORGANIZED HEALTH CARE EDUCATION/TRAINING PROGRAM

## 2023-11-02 PROCEDURE — 74183 MRI ABD W/O CNTR FLWD CNTR: CPT

## 2023-11-02 PROCEDURE — 6360000004 HC RX CONTRAST MEDICATION: Performed by: STUDENT IN AN ORGANIZED HEALTH CARE EDUCATION/TRAINING PROGRAM

## 2023-11-02 RX ADMIN — GADOTERIDOL 20 ML: 279.3 INJECTION, SOLUTION INTRAVENOUS at 14:21

## 2023-11-08 NOTE — PROGRESS NOTES
General acute hospital SURGERY CLINIC   PROGRESS NOTE    DATE: November 1, 2023    SUBJECTIVE:  Jaya Teresa is a 70 y.o. male who returns to the [facility] for evaluation s/p hospitalization for gallstone pancreatitis. Labs and symptoms improved during his admission and he did not require surgical intervention. Since discharge, he states he has been doing well. Denies issues with abdominal pain, nausea/emesis. He is tolerating a diet without any issues. He is having normal bowel movements. During admission, MRCP was performed which showed   Focal thickening of the common bile duct resulting in stenosis 50% inflammatory change vs. A focal soft tissue growth could not be excluded. Surgical history includes Ranjeet en Y gastric bypass and ventral hernia repairs. He has a large ex lap incision on his abdomen.      Past Medical History:   Diagnosis Date    Caffeine use     1 coffee/day    Diabetes ([address])     Epididymitis     Erectile dysfunction     Hyperlipidemia     Hypertension     Personal history of prostate cancer     Urethral stricture        Past Surgical History:   Procedure Laterality Date    BARIATRIC SURGERY      GASTRIC BYPASS SURGERY      HERNIA REPAIR      LYMPHADENECTOMY      PROSTATE BIOPSY      UPPER GASTROINTESTINAL ENDOSCOPY N/A 10/18/2023    EGD ESOPHAGOGASTRODUODENOSCOPY performed by Jeremias Mtz MD at [facility]         Current Outpatient Medications   Medication Sig Dispense Refill    doxazosin (CARDURA) 2 MG tablet Take 1 tablet by mouth nightly      levothyroxine (SYNTHROID) 75 MCG tablet Take 1 tablet by mouth Daily      losartan (COZAAR) 50 MG tablet Take 1 tablet by mouth daily      aspirin 325 MG tablet 1 tablet      glipiZIDE (GLUCOTROL XL) 2.5 MG extended release tablet Take 1 tablet by mouth daily      furosemide (LASIX) 20 MG tablet Take 1 tablet by mouth daily      potassium chloride (K-DUR) 10 MEQ tablet Take 1 tablet by mouth daily      metFORMIN (GLUCOPHAGE) 500 MG

## 2023-11-15 ENCOUNTER — OFFICE VISIT (OUTPATIENT)
Dept: SURGERY | Age: 68
End: 2023-11-15
Payer: MEDICARE

## 2023-11-15 VITALS
WEIGHT: 315 LBS | BODY MASS INDEX: 52.48 KG/M2 | HEIGHT: 65 IN | DIASTOLIC BLOOD PRESSURE: 75 MMHG | HEART RATE: 87 BPM | SYSTOLIC BLOOD PRESSURE: 148 MMHG

## 2023-11-15 DIAGNOSIS — K85.10 GALLSTONE PANCREATITIS: Primary | ICD-10-CM

## 2023-11-15 DIAGNOSIS — K83.1 COMMON BILE DUCT STENOSIS: ICD-10-CM

## 2023-11-15 PROCEDURE — G8417 CALC BMI ABV UP PARAM F/U: HCPCS | Performed by: SURGERY

## 2023-11-15 PROCEDURE — G8427 DOCREV CUR MEDS BY ELIG CLIN: HCPCS | Performed by: SURGERY

## 2023-11-15 PROCEDURE — 1036F TOBACCO NON-USER: CPT | Performed by: SURGERY

## 2023-11-15 PROCEDURE — G8484 FLU IMMUNIZE NO ADMIN: HCPCS | Performed by: SURGERY

## 2023-11-15 PROCEDURE — 3017F COLORECTAL CA SCREEN DOC REV: CPT | Performed by: SURGERY

## 2023-11-15 PROCEDURE — 1123F ACP DISCUSS/DSCN MKR DOCD: CPT | Performed by: SURGERY

## 2023-11-15 PROCEDURE — 99203 OFFICE O/P NEW LOW 30 MIN: CPT | Performed by: SURGERY

## 2023-11-15 PROCEDURE — 1111F DSCHRG MED/CURRENT MED MERGE: CPT | Performed by: SURGERY

## 2023-11-15 PROCEDURE — 3078F DIAST BP <80 MM HG: CPT | Performed by: SURGERY

## 2023-11-15 PROCEDURE — 3074F SYST BP LT 130 MM HG: CPT | Performed by: SURGERY

## 2023-11-15 NOTE — PROGRESS NOTES
History and Physical  [facility]    Patient's Name/Date of Birth: Rebecca Link / 1955 (8 y.o.)    Date: November 15, 2023     HPI: Pt is a 70 y.o. male who returns to [facility] for follow up of repeat MRCP s/p hospitalization for gallstone pancreatitis with MRCP at the time showing stenosis of the common bile duct. Patient reports he is doing well and denies symptoms of nausea, vomiting, abdominal pain, itching, and changing in the coloration of his urine and bowel movements. He is tolerating his diet well and has returned to his normal activities of daily living. His repeat lab values have trended back to normal, with the exception of a mildly elevated alkaline phosphatase.       Due to a complex surgical history includes Ranjeet en Y gastric bypass and ventral hernia repairs, a large ex midline lap incision on his abdomen, and lap port scars patient informed that we will refer him to a HPB specialist.       Past Medical History:   Diagnosis Date    Acute biliary pancreatitis 10/16/2023    Caffeine use     1 coffee/day    Diabetes ([address])     Epididymitis     Erectile dysfunction     Hyperlipidemia     Hypertension     Personal history of prostate cancer     Urethral stricture        Past Surgical History:   Procedure Laterality Date    BARIATRIC SURGERY      GASTRIC BYPASS SURGERY      HERNIA REPAIR      LYMPHADENECTOMY      PROSTATE BIOPSY      UPPER GASTROINTESTINAL ENDOSCOPY N/A 10/18/2023    EGD ESOPHAGOGASTRODUODENOSCOPY performed by Elvin Bowens MD at [facility]         Current Outpatient Medications   Medication Sig Dispense Refill    doxazosin (CARDURA) 2 MG tablet Take 1 tablet by mouth nightly      levothyroxine (SYNTHROID) 75 MCG tablet Take 1 tablet by mouth Daily      losartan (COZAAR) 50 MG tablet Take 1 tablet by mouth daily      aspirin 325 MG tablet 1 tablet      glipiZIDE (GLUCOTROL XL) 2.5 MG extended release tablet Take 1 tablet by mouth daily
Visit Information    Have you changed or started any medications since your last visit including any over-the-counter medicines, vitamins, or herbal medicines? no   Are you having any side effects from any of your medications? -  no  Have you stopped taking any of your medications? Is so, why? -  no    Have you seen any other physician or provider since your last visit? Yes - Records Requested  Have you had any other diagnostic tests since your last visit? MRI   Have you been seen in the emergency room and/or had an admission to a hospital since we last saw you? No  Have you had your routine dental cleaning in the past 6 months? no    Have you activated your [employer] account? If not, what are your barriers?  No:      Patient Care Team:  Marcos Hernandez MD as PCP - General (Family Medicine)  Dino Solo MD as Consulting Physician (Urology)    Medical History Review  Past Medical, Family, and Social History reviewed and does not contribute to the patient presenting condition    Health Maintenance   Topic Date Due    Pneumococcal 65+ years Vaccine (1 - PCV) Never done    Diabetic foot exam  Never done    A1C test (Diabetic or Prediabetic)  Never done    Lipids  Never done    Depression Screen  Never done    Diabetic Alb to Cr ratio (uACR) test  Never done    Diabetic retinal exam  Never done    Colorectal Cancer Screen  Never done    Shingles vaccine (1 of 2) Never done    Hepatitis B vaccine (1 of 3 - Risk 3-dose series) Never done    Annual Wellness Visit (AWV)  Never done    Flu vaccine (1) Never done    COVID-19 Vaccine (3 - 2023-24 season) 09/01/2023    GFR test (Diabetes, CKD 3-4, OR last GFR 15-59)  10/18/2024    DTaP/Tdap/Td vaccine (2 - Td or Tdap) 03/09/2028    Hepatitis C screen  Completed    Hepatitis A vaccine  Aged Out    Hib vaccine  Aged Out    Meningococcal (ACWY) vaccine  Aged Out    Prostate Specific Antigen (PSA) Screening or Monitoring  Discontinued
and denies any acute or chronic symptoms. Plan     Referral placed  for follow up of imaging and surgical evaluation. Patient informed to return to clinic is he has any questions or return of symptoms. Ar Carmona.  MS4  11/15/2023

## 2023-11-15 NOTE — PATIENT INSTRUCTIONS
Thank you letting us take care of you today. We hope that all your questions were addressed. If a question was overlooked or something else comes to mind after you return home, please call our office at [phone]. If you need to cancel or change an appointment, surgery or procedure, please contact the office at [phone].

## 2024-07-12 ENCOUNTER — HOSPITAL ENCOUNTER (EMERGENCY)
Age: 69
Discharge: HOME | End: 2024-07-12
Attending: EMERGENCY MEDICINE
Payer: MEDICARE

## 2024-07-12 ENCOUNTER — APPOINTMENT (OUTPATIENT)
Dept: CT IMAGING | Age: 69
End: 2024-07-12
Payer: MEDICARE

## 2024-07-12 VITALS
WEIGHT: 315 LBS | HEART RATE: 75 BPM | BODY MASS INDEX: 56.58 KG/M2 | SYSTOLIC BLOOD PRESSURE: 161 MMHG | RESPIRATION RATE: 22 BRPM | OXYGEN SATURATION: 100 % | TEMPERATURE: 97.9 F | DIASTOLIC BLOOD PRESSURE: 83 MMHG

## 2024-07-12 DIAGNOSIS — K80.20 CALCULUS OF GALLBLADDER WITHOUT CHOLECYSTITIS WITHOUT OBSTRUCTION: Primary | ICD-10-CM

## 2024-07-12 LAB
ALBUMIN SERPL-MCNC: 3.6 G/DL (ref 3.5–5.2)
ALP SERPL-CCNC: 295 U/L (ref 40–129)
ALT SERPL-CCNC: 98 U/L (ref 5–41)
ANION GAP SERPL CALCULATED.3IONS-SCNC: 13 MMOL/L (ref 9–17)
AST SERPL-CCNC: 357 U/L
BASOPHILS # BLD: 0 K/UL (ref 0–0.2)
BASOPHILS NFR BLD: 0 %
BILIRUB SERPL-MCNC: 1.6 MG/DL (ref 0.3–1.2)
BUN SERPL-MCNC: 19 MG/DL (ref 8–23)
BUN/CREAT SERPL: 17 (ref 9–20)
CALCIUM SERPL-MCNC: 9 MG/DL (ref 8.6–10.4)
CHLORIDE SERPL-SCNC: 99 MMOL/L (ref 98–107)
CO2 SERPL-SCNC: 23 MMOL/L (ref 20–31)
CREAT SERPL-MCNC: 1.1 MG/DL (ref 0.7–1.2)
EOSINOPHIL # BLD: 0 K/UL (ref 0–0.4)
EOSINOPHILS RELATIVE PERCENT: 0 % (ref 1–4)
ERYTHROCYTE [DISTWIDTH] IN BLOOD BY AUTOMATED COUNT: 15.3 % (ref 11.8–14.4)
GFR, ESTIMATED: 72 ML/MIN/1.73M2
GLUCOSE SERPL-MCNC: 169 MG/DL (ref 70–99)
HCT VFR BLD AUTO: 37.5 % (ref 40.7–50.3)
HGB BLD-MCNC: 11.6 G/DL (ref 13–17)
IMM GRANULOCYTES # BLD AUTO: 0 K/UL (ref 0–0.3)
IMM GRANULOCYTES NFR BLD: 0 %
LACTATE BLDV-SCNC: 1.5 MMOL/L (ref 0.5–2.2)
LYMPHOCYTES NFR BLD: 0.57 K/UL (ref 1–4.8)
LYMPHOCYTES RELATIVE PERCENT: 5 % (ref 24–44)
MCH RBC QN AUTO: 26.9 PG (ref 25.2–33.5)
MCHC RBC AUTO-ENTMCNC: 30.9 G/DL (ref 28.4–34.8)
MCV RBC AUTO: 87 FL (ref 82.6–102.9)
MONOCYTES NFR BLD: 0.11 K/UL (ref 0.2–0.8)
MONOCYTES NFR BLD: 1 % (ref 1–7)
MORPHOLOGY: ABNORMAL
NEUTROPHILS NFR BLD: 94 % (ref 36–66)
NEUTS SEG NFR BLD: 10.62 K/UL (ref 1.8–7.7)
NRBC BLD-RTO: 0 PER 100 WBC
PLATELET # BLD AUTO: 290 K/UL (ref 138–453)
PMV BLD AUTO: 9.9 FL (ref 8.1–13.5)
POTASSIUM SERPL-SCNC: 4.6 MMOL/L (ref 3.7–5.3)
PROT SERPL-MCNC: 7.4 G/DL (ref 6.4–8.3)
RBC # BLD AUTO: 4.31 M/UL (ref 4.21–5.77)
SODIUM SERPL-SCNC: 135 MMOL/L (ref 135–144)
WBC OTHER # BLD: 11.3 K/UL (ref 3.5–11.3)

## 2024-07-12 PROCEDURE — 2580000003 HC RX 258: Performed by: EMERGENCY MEDICINE

## 2024-07-12 PROCEDURE — 80053 COMPREHEN METABOLIC PANEL: CPT

## 2024-07-12 PROCEDURE — 99285 EMERGENCY DEPT VISIT HI MDM: CPT

## 2024-07-12 PROCEDURE — 85025 COMPLETE CBC W/AUTO DIFF WBC: CPT

## 2024-07-12 PROCEDURE — 83605 ASSAY OF LACTIC ACID: CPT

## 2024-07-12 PROCEDURE — 6360000004 HC RX CONTRAST MEDICATION: Performed by: EMERGENCY MEDICINE

## 2024-07-12 PROCEDURE — 74177 CT ABD & PELVIS W/CONTRAST: CPT

## 2024-07-12 RX ORDER — SODIUM CHLORIDE 0.9 % (FLUSH) 0.9 %
10 SYRINGE (ML) INJECTION PRN
Status: DISCONTINUED | OUTPATIENT
Start: 2024-07-12 | End: 2024-07-13 | Stop reason: HOSPADM

## 2024-07-12 RX ORDER — 0.9 % SODIUM CHLORIDE 0.9 %
80 INTRAVENOUS SOLUTION INTRAVENOUS ONCE
Status: COMPLETED | OUTPATIENT
Start: 2024-07-12 | End: 2024-07-12

## 2024-07-12 RX ADMIN — SODIUM CHLORIDE, PRESERVATIVE FREE 10 ML: 5 INJECTION INTRAVENOUS at 20:05

## 2024-07-12 RX ADMIN — IOPAMIDOL 75 ML: 755 INJECTION, SOLUTION INTRAVENOUS at 20:05

## 2024-07-12 RX ADMIN — SODIUM CHLORIDE 80 ML: 0.9 INJECTION, SOLUTION INTRAVENOUS at 20:05

## 2024-07-12 ASSESSMENT — ENCOUNTER SYMPTOMS
BACK PAIN: 0
EYE DISCHARGE: 0
FACIAL SWELLING: 0
SHORTNESS OF BREATH: 0
ABDOMINAL PAIN: 1
EYE PAIN: 0
CHEST TIGHTNESS: 0
ABDOMINAL DISTENTION: 0

## 2024-07-12 ASSESSMENT — PAIN DESCRIPTION - LOCATION: LOCATION: ABDOMEN

## 2024-07-12 ASSESSMENT — PAIN DESCRIPTION - FREQUENCY: FREQUENCY: CONTINUOUS

## 2024-07-12 ASSESSMENT — PAIN SCALES - GENERAL: PAINLEVEL_OUTOF10: 2

## 2024-07-12 ASSESSMENT — PAIN - FUNCTIONAL ASSESSMENT: PAIN_FUNCTIONAL_ASSESSMENT: 0-10

## 2024-07-12 ASSESSMENT — PAIN DESCRIPTION - DESCRIPTORS: DESCRIPTORS: NAGGING;TENDER

## 2024-07-12 NOTE — ED NOTES
Pt reports onset of RUQ pain d/t gallbladder issues last November with recent flare up. Denies c/o pain at this time. Denies any n/v. Pt states he is supposed to have surgery, but is not able to at a [facility] facility d/t scar tissue from previous abd surgeries. Pt assisted with blankets. Lights turned down in room for pt comfort. Denies other needs at this time. Aware of wait for lab results and CT scan completion.

## 2024-07-12 NOTE — ED PROVIDER NOTES
EMERGENCY DEPARTMENT ENCOUNTER    Pt Name: Nettie De La Rosa  MRN: [id number]  Birthdate 1955  Date of evaluation: 7/12/24  CHIEF COMPLAINT       Chief Complaint   Patient presents with    Abdominal Pain     RUQ, hx of gallbladder problems    Back Pain     HISTORY OF PRESENT ILLNESS   HPI   The patient is a 69-year-old male with a history of gallstones who presented to the emergency department secondary to abdominal pain.  Patient complains of a 3 to 4-day history of pain localized to his right upper quadrant.  States he was scheduled to have gallbladder removed last year however patient states he was not a good candidate for the surgery secondary to scar tissue from gastric bypass.  Since then he has been medically managed.  Patient has had increased pain.  Patient complains of pain localized to the right upper quadrant 2 out of 10 on the pain scale.  Patient denies chest pain, shortness of breath, nausea, vomiting, fevers or chills      REVIEW OF SYSTEMS     Review of Systems   Constitutional:  Negative for chills, diaphoresis and fever.   HENT:  Negative for congestion, ear pain and facial swelling.    Eyes:  Negative for pain, discharge and visual disturbance.   Respiratory:  Negative for chest tightness and shortness of breath.    Cardiovascular:  Negative for chest pain and palpitations.   Gastrointestinal:  Positive for abdominal pain. Negative for abdominal distention.   Genitourinary:  Negative for difficulty urinating and flank pain.   Musculoskeletal:  Negative for back pain.   Skin:  Negative for wound.   Neurological:  Negative for dizziness, light-headedness and headaches.     PASTMEDICAL HISTORY     Past Medical History:   Diagnosis Date    Acute biliary pancreatitis 10/16/2023    Caffeine use     1 coffee/day    Diabetes (HCC)     Epididymitis     Erectile dysfunction     Hyperlipidemia     Hypertension     Personal history of prostate cancer     Urethral stricture      Past Problem List  Patient

## 2024-07-13 NOTE — ED NOTES
Pt resting on stretcher, watching tv. Pt denies any c/o pain or needs at this time. Awaiting CT scan results and urine results prior to update on plan of care.

## 2024-11-08 ENCOUNTER — HOSPITAL ENCOUNTER (OUTPATIENT)
Age: 69
Discharge: HOME | End: 2024-11-08
Payer: MEDICARE

## 2024-11-08 LAB — PSA SERPL-MCNC: <0.03 NG/ML (ref 0–4)

## 2024-11-08 PROCEDURE — 84153 ASSAY OF PSA TOTAL: CPT

## 2024-11-08 PROCEDURE — 36415 COLL VENOUS BLD VENIPUNCTURE: CPT

## 2024-11-14 ENCOUNTER — OFFICE VISIT (OUTPATIENT)
Age: 69
End: 2024-11-14
Payer: MEDICARE

## 2024-11-14 VITALS — HEIGHT: 65 IN | WEIGHT: 315 LBS | BODY MASS INDEX: 52.48 KG/M2

## 2024-11-14 DIAGNOSIS — Z85.46 PERSONAL HISTORY OF PROSTATE CANCER: ICD-10-CM

## 2024-11-14 DIAGNOSIS — N52.8 OTHER MALE ERECTILE DYSFUNCTION: Primary | ICD-10-CM

## 2024-11-14 LAB
BILIRUBIN, POC: NORMAL
BLOOD URINE, POC: NORMAL
CLARITY, POC: CLEAR
COLOR, POC: YELLOW
GLUCOSE URINE, POC: NORMAL MG/DL
KETONES, POC: NORMAL
LEUKOCYTE EST, POC: NORMAL
NITRITE, POC: NORMAL
PH, POC: NORMAL
PROTEIN, POC: NORMAL MG/DL
SPECIFIC GRAVITY, POC: NORMAL
UROBILINOGEN, POC: NORMAL

## 2024-11-14 PROCEDURE — 1036F TOBACCO NON-USER: CPT | Performed by: SPECIALIST

## 2024-11-14 PROCEDURE — 3017F COLORECTAL CA SCREEN DOC REV: CPT | Performed by: SPECIALIST

## 2024-11-14 PROCEDURE — 1123F ACP DISCUSS/DSCN MKR DOCD: CPT | Performed by: SPECIALIST

## 2024-11-14 PROCEDURE — G8428 CUR MEDS NOT DOCUMENT: HCPCS | Performed by: SPECIALIST

## 2024-11-14 PROCEDURE — 81003 URINALYSIS AUTO W/O SCOPE: CPT | Performed by: SPECIALIST

## 2024-11-14 PROCEDURE — 99213 OFFICE O/P EST LOW 20 MIN: CPT | Performed by: SPECIALIST

## 2024-11-14 PROCEDURE — G8484 FLU IMMUNIZE NO ADMIN: HCPCS | Performed by: SPECIALIST

## 2024-11-14 PROCEDURE — G8417 CALC BMI ABV UP PARAM F/U: HCPCS | Performed by: SPECIALIST

## 2024-11-14 NOTE — PROGRESS NOTES
John Nichole MD [facility] Urology Office Progress Note    Patient:  Nettie De La Rosa  YOB: 1955  Date: 11/14/2024    HISTORY OF PRESENT ILLNESS:   The patient is a 69 y.o. male  No evidence of prostate cancer recurrence s/p XRT ending 2004 since his PSA is 0.03 which is stable/low.   Not interested in medical Rx for ED since patient not sexually active.   Lower urinary tract symptoms: hesitancy and nocturia, 1 times per night   Last AUA Symptom Score (QOL): 1 (0)  Today's AUA Symptom Score (QOL): 2 (0)    Summary of old records:   Iris mo  History of urethral stricture s/p cysto, dilation 8/28/07; 3/6/17 cysto: 2 large caliber bulbous strictures  History of prostate cancer: T1c T2b G9, XRT and Lupron 2004  ED on Cialis 20 mg prn, no longer needs since he lost weight  10/16/23 E coli UTI  Right 1.9 cm adrenal myelolipoma on 10/16/23 CT    Additional History: none    Procedures Today: N/A    Urinalysis today:  Results for orders placed or performed in visit on 11/14/24   POCT Urinalysis No Micro (Auto)   Result Value Ref Range    Color, UA yellow     Clarity, UA clear     Glucose, UA POC neg mg/dL    Bilirubin, UA      Ketones, UA      Spec Grav, UA      Blood, UA POC neg     pH, UA      Protein, UA POC 30 mg mg/dL    Urobilinogen, UA      Leukocytes, UA neg     Nitrite, UA neg        Last several PSA's:  Lab Results   Component Value Date    PSA <0.03 11/08/2024    PSA 0.03 10/30/2023    PSA 0.02 10/20/2022       Last total testosterone:  No results found for: \"TESTOSTERONE\"    Last BUN and creatinine:  Lab Results   Component Value Date    BUN 19 07/12/2024     Lab Results   Component Value Date    CREATININE 1.10 09/20/2024       Last CBC:  Lab Results   Component Value Date    WBC 10.96 (H) 09/20/2024    HGB 11.1 (L) 09/20/2024    HCT 34.7 (L) 09/20/2024    MCV 87.4 09/20/2024     (H) 09/20/2024       Additional Lab/Culture results: none    Imaging

## 2025-01-30 ENCOUNTER — HOSPITAL ENCOUNTER (OUTPATIENT)
Age: 70
Discharge: HOME | End: 2025-02-01
Payer: MEDICARE

## 2025-01-30 ENCOUNTER — HOSPITAL ENCOUNTER (OUTPATIENT)
Dept: GENERAL RADIOLOGY | Age: 70
Discharge: HOME | End: 2025-02-01
Payer: MEDICARE

## 2025-01-30 DIAGNOSIS — R22.41 KNEE MASS, RIGHT: ICD-10-CM

## 2025-01-30 PROCEDURE — 73562 X-RAY EXAM OF KNEE 3: CPT

## (undated) DEVICE — SUCTION IRRIGATOR: Brand: ENDOWRIST

## (undated) DEVICE — BLANKET WRM W29.9XL79.1IN UP BODY FORC AIR MISTRAL-AIR

## (undated) DEVICE — NEEDLE HYPO 25GA L1.5IN BLU POLYPR HUB S STL REG BVL STR

## (undated) DEVICE — CANNULA SEAL

## (undated) DEVICE — CO2 CANNULA,SUPERSOFT, ADLT,7'O2,7'CO2: Brand: MEDLINE

## (undated) DEVICE — LIQUIBAND RAPID ADHESIVE 36/CS 0.8ML: Brand: MEDLINE

## (undated) DEVICE — DEVICE TRCR 12X9X3IN WHT CLSR DISP OMNICLOSE

## (undated) DEVICE — ADAPTER TBNG LUER STUB 15 GA INTMED

## (undated) DEVICE — BLADELESS OBTURATOR: Brand: WECK VISTA

## (undated) DEVICE — SUTURE MCRYL SZ 4-0 L27IN ABSRB UD L19MM PS-2 1/2 CIR PRIM Y426H

## (undated) DEVICE — VESSEL SEALER EXTEND: Brand: ENDOWRIST

## (undated) DEVICE — GAUZE,SPONGE,4"X4",16PLY,STRL,LF,10/TRAY: Brand: MEDLINE

## (undated) DEVICE — ANCHOR TISSUE RETRIEVAL SYSTEM, BAG SIZE 125 ML, PORT SIZE 8 MM: Brand: ANCHOR TISSUE RETRIEVAL SYSTEM

## (undated) DEVICE — DUAL LUMEN STOMACH TUBE: Brand: SALEM SUMP

## (undated) DEVICE — BITEBLOCK ENDOSCP 60FR MAXI WHT POLYETH STURDY W/ VELC WVN

## (undated) DEVICE — POSITIONER HD W8XH4XL8.5IN RASPBERRY FOAM SLT

## (undated) DEVICE — INSUFFLATION NEEDLE TO ESTABLISH PNEUMOPERITONEUM.: Brand: INSUFFLATION NEEDLE

## (undated) DEVICE — MEDICINE CUP, GRADUATED, STER: Brand: MEDLINE

## (undated) DEVICE — ST. ANNE'S MULTI PORT PACK: Brand: MEDLINE INDUSTRIES, INC.

## (undated) DEVICE — SUTURE SZ 0 27IN 5/8 CIR UR-6  TAPER PT VIOLET ABSRB VICRYL J603H

## (undated) DEVICE — GLOVE SURG 8 11.7IN BEAD CUF LIGHT BRN SENSICARE LTX FREE

## (undated) DEVICE — APPLICATOR MEDICATED 26 CC SOLUTION HI LT ORNG CHLORAPREP

## (undated) DEVICE — ARM DRAPE

## (undated) DEVICE — ELECTRODE PT RET AD L9FT HI MOIST COND ADH HYDRGEL CORDED

## (undated) DEVICE — ELECTRO LUBE IS A SINGLE PATIENT USE DEVICE THAT IS INTENDED TO BE USED ON ELECTROSURGICAL ELECTRODES TO REDUCE STICKING.: Brand: KEY SURGICAL ELECTRO LUBE

## (undated) DEVICE — JELLY,LUBE,STERILE,FLIP TOP,TUBE,2-OZ: Brand: MEDLINE

## (undated) DEVICE — GARMENT,MEDLINE,DVT,INT,CALF,MED, GEN2: Brand: MEDLINE

## (undated) DEVICE — TROCAR: Brand: KII FIOS FIRST ENTRY